# Patient Record
Sex: MALE | Race: WHITE | NOT HISPANIC OR LATINO | Employment: OTHER | URBAN - METROPOLITAN AREA
[De-identification: names, ages, dates, MRNs, and addresses within clinical notes are randomized per-mention and may not be internally consistent; named-entity substitution may affect disease eponyms.]

---

## 2017-01-10 ENCOUNTER — OFFICE VISIT (OUTPATIENT)
Dept: ENDOCRINOLOGY | Age: 62
End: 2017-01-10

## 2017-01-10 VITALS
BODY MASS INDEX: 28.5 KG/M2 | SYSTOLIC BLOOD PRESSURE: 130 MMHG | OXYGEN SATURATION: 97 % | HEIGHT: 72 IN | DIASTOLIC BLOOD PRESSURE: 70 MMHG | WEIGHT: 210.4 LBS | HEART RATE: 62 BPM

## 2017-01-10 DIAGNOSIS — I10 ESSENTIAL HYPERTENSION: Primary | ICD-10-CM

## 2017-01-10 DIAGNOSIS — N18.9 CKD (CHRONIC KIDNEY DISEASE), UNSPECIFIED STAGE: ICD-10-CM

## 2017-01-10 DIAGNOSIS — E78.5 HYPERLIPIDEMIA, UNSPECIFIED HYPERLIPIDEMIA TYPE: ICD-10-CM

## 2017-01-10 DIAGNOSIS — E11.8 TYPE 2 DIABETES MELLITUS WITH COMPLICATION, WITHOUT LONG-TERM CURRENT USE OF INSULIN (HCC): ICD-10-CM

## 2017-01-10 PROCEDURE — 99214 OFFICE O/P EST MOD 30 MIN: CPT | Performed by: INTERNAL MEDICINE

## 2017-01-10 NOTE — PROGRESS NOTES
"Subjective   Jamal Lemus is a 61 y.o. male.     HPI Comments: Hypertension. DM2. B/S 0x a day.Last DM eye exam about a year ago. Last DM foot exam today with Dr. Hayes.    Hypertension     Diabetes        He has type 2 diabetes mellitus and has been on Tradjenta 5 mg once a day once a day. He does not check his blood sugars at home. He has gained 4 lbs since 6/16.     His last eye examination was 12/15 and there is no retinopathy. He denies any blurred vision. He denies any paresthesias.  He has no microalbuminuria on urine sample taken in June 2016     He has hypertension and renal insufficiency. He is on Coreg, chlorthalidone, eplerenone, and fosinopril. He follows with Dr. Johnson. He denies any chest pain, shortness of breath, or pedal edema.     He has hyperlipidemia and has been on Lipitor 10 mg/day. He denies any myalgia. His last meal was 7 AM.    He has just retired.  He is due for colonoscopy and prostate exam.  He does not have a primary care physician yet.    The following portions of the patient's history were reviewed and updated as appropriate: allergies, current medications, past family history, past medical history, past social history, past surgical history and problem list.    Review of Systems   Constitutional: Negative.    HENT: Negative.    Eyes: Negative.    Respiratory: Negative.    Cardiovascular: Negative.    Gastrointestinal: Negative.    Endocrine: Negative.    Genitourinary: Negative.    Musculoskeletal: Negative.    Skin: Negative.    Allergic/Immunologic: Negative.    Neurological: Negative.    Hematological: Negative.    Psychiatric/Behavioral: Negative.        Objective      Vitals:    01/10/17 1012   BP: 150/100   BP Location: Right arm   Patient Position: Sitting   Cuff Size: Large Adult   Pulse: 62   SpO2: 97%   Weight: 210 lb 6.4 oz (95.4 kg)   Height: 72\" (182.9 cm)     Physical Exam   Constitutional: He is oriented to person, place, and time. He appears well-developed and " well-nourished. No distress.   HENT:   Head: Normocephalic.   Nose: Nose normal.   Mouth/Throat: No oropharyngeal exudate.   Eyes: Conjunctivae and EOM are normal. Right eye exhibits no discharge. Left eye exhibits no discharge. No scleral icterus.   Neck: Neck supple. No JVD present. No tracheal deviation present. No thyromegaly present.   Cardiovascular: Normal rate, regular rhythm and intact distal pulses.  Exam reveals no friction rub.    No murmur heard.  Pulmonary/Chest: Effort normal and breath sounds normal. He has no wheezes. He has no rales. He exhibits no tenderness.   Abdominal: Soft. Bowel sounds are normal. He exhibits no distension and no mass. There is no tenderness.   Musculoskeletal: Normal range of motion. He exhibits no edema, tenderness or deformity.   Lymphadenopathy:     He has no cervical adenopathy.   Neurological: He is alert and oriented to person, place, and time. He displays normal reflexes. Coordination normal.   Intact light touch   Skin: Skin is warm and dry. No rash noted. No erythema. No pallor.   Psychiatric: He has a normal mood and affect. His behavior is normal.     Office Visit on 06/08/2016   Component Date Value Ref Range Status   • Glucose 06/08/2016 111* 65 - 99 mg/dL Final   • BUN 06/08/2016 28* 8 - 23 mg/dL Final   • Creatinine 06/08/2016 1.76* 0.76 - 1.27 mg/dL Final   • eGFR Non African Am 06/08/2016 40* >60 mL/min/1.73 Final   • eGFR African Am 06/08/2016 48* >60 mL/min/1.73 Final   • BUN/Creatinine Ratio 06/08/2016 15.9  7.0 - 25.0 Final   • Sodium 06/08/2016 139  136 - 145 mmol/L Final   • Potassium 06/08/2016 4.8  3.5 - 5.2 mmol/L Final   • Chloride 06/08/2016 101  98 - 107 mmol/L Final   • Total CO2 06/08/2016 24.3  22.0 - 29.0 mmol/L Final   • Calcium 06/08/2016 10.5  8.6 - 10.5 mg/dL Final   • Total Protein 06/08/2016 7.6  6.0 - 8.5 g/dL Final   • Albumin 06/08/2016 4.80  3.50 - 5.20 g/dL Final   • Globulin 06/08/2016 2.8  gm/dL Final   • A/G Ratio 06/08/2016 1.7   g/dL Final   • Total Bilirubin 06/08/2016 0.7  0.1 - 1.2 mg/dL Final   • Alkaline Phosphatase 06/08/2016 93  39 - 117 U/L Final   • AST (SGOT) 06/08/2016 29  1 - 40 U/L Final   • ALT (SGPT) 06/08/2016 66* 1 - 41 U/L Final   • Total Cholesterol 06/08/2016 140  0 - 200 mg/dL Final   • Triglycerides 06/08/2016 97  0 - 150 mg/dL Final   • HDL Cholesterol 06/08/2016 59  40 - 60 mg/dL Final   • VLDL Cholesterol 06/08/2016 19.4  5 - 40 mg/dL Final   • LDL Cholesterol  06/08/2016 62  0 - 100 mg/dL Final   • Hemoglobin A1C 06/08/2016 6.40* 4.80 - 5.60 % Final   • TSH 06/08/2016 1.460  0.270 - 4.200 mIU/mL Final   • Creatinine, Urine 06/08/2016 52.7  Not Estab. mg/dL Final   • Microalbumin, Urine 06/08/2016 <3.0  Not Estab. ug/mL Final   • Microalbumin/Creatinine Ratio Urine 06/08/2016 <5.7  0.0 - 30.0 mg/g creat Final     Assessment/Plan   Jamal was seen today for hypertension and diabetes.    Diagnoses and all orders for this visit:    Essential hypertension    Type 2 diabetes mellitus with complication, without long-term current use of insulin    CKD (chronic kidney disease), unspecified stage    Hyperlipidemia, unspecified hyperlipidemia type      Continue Tradjenta 5 mg once a day.  Check hemoglobin A1c.  Continue Coreg, chlorthalidone, eplerenone, and fosinopril per Dr. Johnson.  Follow-up with Dr. Johnson as scheduled  Continue Lipitor 10 mg once a day.  Check lipid profile.  Advised to see her primary care physician for colonoscopy and prostate exam.    Send copy of my notes and labs to Dr. Johnson.    RTC 6 mos.

## 2017-01-10 NOTE — LETTER
January 10, 2017     Bull Johnson MD  [address]  [address]  [place] [id number]    Patient: Jamal Lemus   YOB: 1955   Date of Visit: 1/10/2017       Dear Dr. Alex MD:    Thank you for referring Jamal Lemus to me for evaluation. Below are the relevant portions of my assessment and plan of care.    If you have questions, please do not hesitate to call me. I look forward to following Jamal along with you.         Sincerely,        Miguel Ángel Hayes MD        CC: No Recipients  Miguel Ángel Hayes MD  1/10/2017 11:19 AM  Sign at close encounter  Subjective   Jamal Lemus is a 61 y.o. male.     HPI Comments: Hypertension. DM2. B/S 0x a day.Last DM eye exam about a year ago. Last DM foot exam today with Dr. Hayes.    Hypertension     Diabetes        He has type 2 diabetes mellitus and has been on Tradjenta 5 mg once a day once a day. He does not check his blood sugars at home. He has gained 4 lbs since 6/16.     His last eye examination was 12/15 and there is no retinopathy. He denies any blurred vision. He denies any paresthesias.  He has no microalbuminuria on urine sample taken in June 2016     He has hypertension and renal insufficiency. He is on Coreg, chlorthalidone, eplerenone, and fosinopril. He follows with Dr. Johnson. He denies any chest pain, shortness of breath, or pedal edema.     He has hyperlipidemia and has been on Lipitor 10 mg/day. He denies any myalgia. His last meal was 7 AM.    He has just retired.  He is due for colonoscopy and prostate exam.  He does not have a primary care physician yet.    The following portions of the patient's history were reviewed and updated as appropriate: allergies, current medications, past family history, past medical history, past social history, past surgical history and problem list.    Review of Systems   Constitutional: Negative.    HENT: Negative.    Eyes: Negative.    Respiratory: Negative.    Cardiovascular: Negative.    Gastrointestinal:  "Negative.    Endocrine: Negative.    Genitourinary: Negative.    Musculoskeletal: Negative.    Skin: Negative.    Allergic/Immunologic: Negative.    Neurological: Negative.    Hematological: Negative.    Psychiatric/Behavioral: Negative.        Objective      Vitals:    01/10/17 1012   BP: 150/100   BP Location: Right arm   Patient Position: Sitting   Cuff Size: Large Adult   Pulse: 62   SpO2: 97%   Weight: 210 lb 6.4 oz (95.4 kg)   Height: 72\" (182.9 cm)     Physical Exam   Constitutional: He is oriented to person, place, and time. He appears well-developed and well-nourished. No distress.   HENT:   Head: Normocephalic.   Nose: Nose normal.   Mouth/Throat: No oropharyngeal exudate.   Eyes: Conjunctivae and EOM are normal. Right eye exhibits no discharge. Left eye exhibits no discharge. No scleral icterus.   Neck: Neck supple. No JVD present. No tracheal deviation present. No thyromegaly present.   Cardiovascular: Normal rate, regular rhythm and intact distal pulses.  Exam reveals no friction rub.    No murmur heard.  Pulmonary/Chest: Effort normal and breath sounds normal. He has no wheezes. He has no rales. He exhibits no tenderness.   Abdominal: Soft. Bowel sounds are normal. He exhibits no distension and no mass. There is no tenderness.   Musculoskeletal: Normal range of motion. He exhibits no edema, tenderness or deformity.   Lymphadenopathy:     He has no cervical adenopathy.   Neurological: He is alert and oriented to person, place, and time. He displays normal reflexes. Coordination normal.   Intact light touch   Skin: Skin is warm and dry. No rash noted. No erythema. No pallor.   Psychiatric: He has a normal mood and affect. His behavior is normal.     Office Visit on 06/08/2016   Component Date Value Ref Range Status   • Glucose 06/08/2016 111* 65 - 99 mg/dL Final   • BUN 06/08/2016 28* 8 - 23 mg/dL Final   • Creatinine 06/08/2016 1.76* 0.76 - 1.27 mg/dL Final   • eGFR Non African Am 06/08/2016 40* >60 " mL/min/1.73 Final   • eGFR  Am 06/08/2016 48* >60 mL/min/1.73 Final   • BUN/Creatinine Ratio 06/08/2016 15.9  7.0 - 25.0 Final   • Sodium 06/08/2016 139  136 - 145 mmol/L Final   • Potassium 06/08/2016 4.8  3.5 - 5.2 mmol/L Final   • Chloride 06/08/2016 101  98 - 107 mmol/L Final   • Total CO2 06/08/2016 24.3  22.0 - 29.0 mmol/L Final   • Calcium 06/08/2016 10.5  8.6 - 10.5 mg/dL Final   • Total Protein 06/08/2016 7.6  6.0 - 8.5 g/dL Final   • Albumin 06/08/2016 4.80  3.50 - 5.20 g/dL Final   • Globulin 06/08/2016 2.8  gm/dL Final   • A/G Ratio 06/08/2016 1.7  g/dL Final   • Total Bilirubin 06/08/2016 0.7  0.1 - 1.2 mg/dL Final   • Alkaline Phosphatase 06/08/2016 93  39 - 117 U/L Final   • AST (SGOT) 06/08/2016 29  1 - 40 U/L Final   • ALT (SGPT) 06/08/2016 66* 1 - 41 U/L Final   • Total Cholesterol 06/08/2016 140  0 - 200 mg/dL Final   • Triglycerides 06/08/2016 97  0 - 150 mg/dL Final   • HDL Cholesterol 06/08/2016 59  40 - 60 mg/dL Final   • VLDL Cholesterol 06/08/2016 19.4  5 - 40 mg/dL Final   • LDL Cholesterol  06/08/2016 62  0 - 100 mg/dL Final   • Hemoglobin A1C 06/08/2016 6.40* 4.80 - 5.60 % Final   • TSH 06/08/2016 1.460  0.270 - 4.200 mIU/mL Final   • Creatinine, Urine 06/08/2016 52.7  Not Estab. mg/dL Final   • Microalbumin, Urine 06/08/2016 <3.0  Not Estab. ug/mL Final   • Microalbumin/Creatinine Ratio Urine 06/08/2016 <5.7  0.0 - 30.0 mg/g creat Final     Assessment/Plan   Jamal was seen today for hypertension and diabetes.    Diagnoses and all orders for this visit:    Essential hypertension    Type 2 diabetes mellitus with complication, without long-term current use of insulin    CKD (chronic kidney disease), unspecified stage    Hyperlipidemia, unspecified hyperlipidemia type      Continue Tradjenta 5 mg once a day.  Check hemoglobin A1c.  Continue Coreg, chlorthalidone, eplerenone, and fosinopril per Dr. Johnson.  Follow-up with Dr. Johnson as scheduled  Continue Lipitor 10 mg once a day.  Check  lipid profile.  Advised to see her primary care physician for colonoscopy and prostate exam.    Send copy of my notes and labs to Dr. Johnson.    RTC 6 mos.

## 2017-01-10 NOTE — MR AVS SNAPSHOT
Jamal Lemus   1/10/2017 10:00 AM   Office Visit    Dept Phone:  [phone]   Encounter #:  [id number]    Provider:  Miguel Ángel Hayes MD   Department:  [facility] MEDICAL [facility] ENDOCRINOLOGY                Your Full Care Plan              Your Updated Medication List          This list is accurate as of: 1/10/17 11:36 AM.  Always use your most recent med list.                atorvastatin 10 MG tablet   Commonly known as:  LIPITOR   Take 1 tablet by mouth daily.       carvedilol 25 MG tablet   Commonly known as:  COREG       chlorthalidone 25 MG tablet   Commonly known as:  HYGROTON       eplerenone 25 MG tablet   Commonly known as:  INSPRA       fosinopril 20 MG tablet   Commonly known as:  MONOPRIL       linagliptin 5 MG tablet tablet   Commonly known as:  TRADJENTA   Take 1 tablet by mouth Daily.               We Performed the Following     Comprehensive Metabolic Panel     Hemoglobin A1c     Lipid Panel       You Were Diagnosed With        Codes Comments    Essential hypertension    -  Primary ICD-10-CM: I10  ICD-9-CM: 401.9     Type 2 diabetes mellitus with complication, without long-term current use of insulin     ICD-10-CM: E11.8  ICD-9-CM: 250.90     CKD (chronic kidney disease), unspecified stage     ICD-10-CM: N18.9  ICD-9-CM: 585.9     Hyperlipidemia, unspecified hyperlipidemia type     ICD-10-CM: E78.5  ICD-9-CM: 272.4       Instructions     None    Patient Instructions History      Upcoming Appointments     Visit Type Date Time Department    OFFICE VISIT 1/10/2017 10:00 AM [facility]    OFFICE VISIT 7/10/2017 10:00 AM [facility] STEV[facility]CIHA[facility]      Medic Trace Signup     Our records indicate that you have an active [employer] account.    You can view your After Visit Summary by going to [employer] and logging in with your Medic Trace username and password.  If you don't have a Medic Trace username and password but a parent or guardian has access to your  "record, the parent or guardian should login with their own [employer] username and password and access your record to view the After Visit Summary.    If you have questions, you can email [email or web address] or call [phone] to talk to our [employer] staff.  Remember, [employer] is NOT to be used for urgent needs.  For medical emergencies, dial 911.               Other Info from Your Visit           Your Appointments     Jul 10, 2017 10:00 AM EDT   Office Visit with Miguel Ángel Hayes MD   [facility] MEDICAL [facility] ENDOCRINOLOGY (--)    [address] [place] [id number] [phone]           Arrive 15 minutes prior to appointment.              Allergies     No Known Allergies      Reason for Visit     Hypertension     Diabetes           Vital Signs     Blood Pressure Pulse Height Weight Oxygen Saturation Body Mass Index    130/70 62 72\" (182.9 cm) 210 lb 6.4 oz (95.4 kg) 97% 28.54 kg/m2    Smoking Status                   Never Smoker           Problems and Diagnoses Noted     Chronic kidney disease    High blood pressure    High cholesterol or triglycerides    Type 2 diabetes mellitus with manifestations        "

## 2017-01-11 LAB
ALBUMIN SERPL-MCNC: 4.7 G/DL (ref 3.5–5.2)
ALBUMIN/GLOB SERPL: 1.7 G/DL
ALP SERPL-CCNC: 89 U/L (ref 39–117)
ALT SERPL-CCNC: 40 U/L (ref 1–41)
AST SERPL-CCNC: 21 U/L (ref 1–40)
BILIRUB SERPL-MCNC: 0.8 MG/DL (ref 0.1–1.2)
BUN SERPL-MCNC: 26 MG/DL (ref 8–23)
BUN/CREAT SERPL: 16 (ref 7–25)
CALCIUM SERPL-MCNC: 10.3 MG/DL (ref 8.6–10.5)
CHLORIDE SERPL-SCNC: 97 MMOL/L (ref 98–107)
CHOLEST SERPL-MCNC: 147 MG/DL (ref 0–200)
CO2 SERPL-SCNC: 24.4 MMOL/L (ref 22–29)
CREAT SERPL-MCNC: 1.63 MG/DL (ref 0.76–1.27)
GLOBULIN SER CALC-MCNC: 2.8 GM/DL
GLUCOSE SERPL-MCNC: 99 MG/DL (ref 65–99)
HBA1C MFR BLD: 6.35 % (ref 4.8–5.6)
HDLC SERPL-MCNC: 60 MG/DL (ref 40–60)
LDLC SERPL CALC-MCNC: 67 MG/DL (ref 0–100)
POTASSIUM SERPL-SCNC: 4.8 MMOL/L (ref 3.5–5.2)
PROT SERPL-MCNC: 7.5 G/DL (ref 6–8.5)
SODIUM SERPL-SCNC: 136 MMOL/L (ref 136–145)
TRIGL SERPL-MCNC: 98 MG/DL (ref 0–150)
VLDLC SERPL CALC-MCNC: 19.6 MG/DL (ref 5–40)

## 2017-02-09 RX ORDER — LINAGLIPTIN 5 MG/1
TABLET, FILM COATED ORAL
Qty: 90 TABLET | Refills: 1 | Status: SHIPPED | OUTPATIENT
Start: 2017-02-09 | End: 2017-08-08 | Stop reason: SDUPTHER

## 2017-03-24 ENCOUNTER — TRANSCRIBE ORDERS (OUTPATIENT)
Dept: ADMINISTRATIVE | Facility: HOSPITAL | Age: 62
End: 2017-03-24

## 2017-03-24 DIAGNOSIS — L98.9 SKIN LESION: Primary | ICD-10-CM

## 2017-04-24 ENCOUNTER — HOSPITAL ENCOUNTER (OUTPATIENT)
Dept: INFUSION THERAPY | Facility: HOSPITAL | Age: 62
Discharge: HOME | End: 2017-04-24
Attending: SURGERY | Admitting: SURGERY

## 2017-04-24 VITALS
SYSTOLIC BLOOD PRESSURE: 169 MMHG | DIASTOLIC BLOOD PRESSURE: 96 MMHG | TEMPERATURE: 96 F | HEART RATE: 76 BPM | RESPIRATION RATE: 20 BRPM | OXYGEN SATURATION: 96 %

## 2017-04-24 DIAGNOSIS — L98.9 SKIN LESIONS: Primary | ICD-10-CM

## 2017-04-24 PROCEDURE — 88331 PATH CONSLTJ SURG 1 BLK 1SPC: CPT | Performed by: SURGERY

## 2017-04-24 PROCEDURE — 88332 PATH CONSLTJ SURG EA ADD BLK: CPT | Performed by: SURGERY

## 2017-04-24 PROCEDURE — 88305 TISSUE EXAM BY PATHOLOGIST: CPT | Performed by: SURGERY

## 2017-04-24 RX ORDER — LIDOCAINE HYDROCHLORIDE AND EPINEPHRINE 10; 10 MG/ML; UG/ML
20 INJECTION, SOLUTION INFILTRATION; PERINEURAL ONCE
Status: DISCONTINUED | OUTPATIENT
Start: 2017-04-24 | End: 2017-04-26 | Stop reason: HOSPADM

## 2017-04-24 RX ORDER — LIDOCAINE HYDROCHLORIDE AND EPINEPHRINE 10; 10 MG/ML; UG/ML
20 INJECTION, SOLUTION INFILTRATION; PERINEURAL ONCE
Status: COMPLETED | OUTPATIENT
Start: 2017-04-24 | End: 2017-04-24

## 2017-04-24 RX ADMIN — SODIUM BICARBONATE 0.5 ML: 84 INJECTION, SOLUTION INTRAVENOUS at 08:07

## 2017-04-24 RX ADMIN — LIDOCAINE HYDROCHLORIDE,EPINEPHRINE BITARTRATE 10 ML: 10; .01 INJECTION, SOLUTION INFILTRATION; PERINEURAL at 08:07

## 2017-04-24 NOTE — PATIENT INSTRUCTIONS
MINOR SURGERY DISCHARGE INSTRUCTIONS    IMPORTANT:  The following information will help you return to your best level of health.  Wound Care:  ·  Your dressing on forehead may be removed:  ·   Tomorrow    · Dressing on shoulder may be removed in 4 to 5 days  ·  Clean suture line with peroxide 1-2 times a day.  ·  If incision is on head or neck, cover your pillow with a towel.  ·  Avoid stooping over or heavy lifting.  ·  You may use band aids after dressing is removed; if desired.  ·  Avoid sun exposure to site.  You may shower:  ·  Tomorrow  ·  Apply ointment to incision 1-2 times a day.  Remove old ointment first.  ·  Apply ice to area for 24 hours - 15 minutes on & 15 minutes off.  30 minutes on  & 30 minutes off while awake.  Use an extra pillow when sleeping.  ·  Elevate area for 24-48 hours to reduce swelling.  Medications:   Following this procedure, you should continue to take all other medications as  directed by your primary physician unless otherwise instructed. There have been  no changes to the medications you provided us (with the following exceptions, if  applicable):     You may use Tylenol  for discomfort.   Other: _______________________________________________________  Activity:  ·   ·  You may resume normal activity:  ·    In ___2___ days  ·  No strenuous activity, lifting or sports:  ·   Until seen by your MD    Call your doctor to make an appointment for:     On (date) ____May 2nd____________________________  Physician: Dr. Bell  Office # [phone]  Incision Care  An incision is when a surgeon cuts into your body. After surgery, the incision needs to be cared for properly to prevent infection.   HOW TO CARE FOR YOUR INCISION  · Take medicines only as directed by your health care provider.  · There are many different ways to close and cover an incision, including stitches, skin glue, and adhesive strips. Follow your health care provider's instructions on:    Incision care.    Bandage  (dressing) changes and removal.    Incision closure removal.  · Do not take baths, swim, or use a hot tub until your health care provider approves. You may shower as directed by your health care provider.  · Resume your normal diet and activities as directed.  · Use anti-itch medicine (such as an antihistamine) as directed by your health care provider. The incision may itch while it is healing. Do not pick or scratch at the incision.  · Drink enough fluid to keep your urine clear or pale yellow.  SEEK MEDICAL CARE IF:   · You have drainage, redness, swelling, or pain at your incision site.  · You have muscle aches, chills, or a general ill feeling.  · You notice a bad smell coming from the incision or dressing.  · Your incision edges separate after the sutures, staples, or skin adhesive strips have been removed.  · You have persistent nausea or vomiting.  · You have a fever.  · You are dizzy.  SEEK IMMEDIATE MEDICAL CARE IF:   · You have a rash.  · You faint.  · You have difficulty breathing.  MAKE SURE YOU:   · Understand these instructions.  · Will watch your condition.  · Will get help right away if you are not doing well or get worse.     This information is not intended to replace advice given to you by your health care provider. Make sure you discuss any questions you have with your health care provider.     Document Released: 07/07/2006 Document Revised: 01/08/2016 Document Reviewed: 02/11/2015  [employer] Interactive Patient Education ©2016 [employer]

## 2017-04-24 NOTE — OP NOTE
DATE OF PROCEDURE:  04/24/2017    SURGEON: Tim Bell MD     ASSISTANT: None.     PREOPERATIVE DIAGNOSES:  1.  Basal cell carcinoma of right forehead.    2.  Basal cell carcinoma of left posterior shoulder (back).  3.  Personal history of multiple skin cancers.     POSTOPERATIVE DIAGNOSES:   1.  Basal cell carcinoma of right forehead.    2.  Basal cell carcinoma of left posterior shoulder (back).  3.  Personal history of multiple skin cancers.     PROCEDURES PERFORMED:   1.  Excision of basal cell carcinoma of the forehead with frozen section control of margins and layered closure.   2.  Excision of basal cell carcinoma of left posterior shoulder with frozen section and layered closure.     ANESTHESIA: 1% lidocaine with epinephrine.     FINDINGS: The patient was brought to the procedure room in the ambulatory care unit. He was marked out for a lenticular excision around the 2 previously identified clinical basal cell carcinomas. Then 1% lidocaine with epinephrine was infiltrated locally.     He was placed in a prone position for access to remove the lesion of the posterior shoulder. He was prepped with Betadine and draped into a sterile field. The lesion was excised and marked with a short suture at the superior edge and a long suture on the left lateral tip. It was submitted for frozen section. Wide undermining was carried out and hemostasis achieved with electrocautery. Wound closure was accomplished with 4-0 Vicryl subcuticular sutures and 5-0 nylon skin sutures. The wound was dressed with bacitracin ointment, 2 x 2, and Tegaderm.     The patient was then returned to a supine position. His head was elevated. The previously marked out the site was prepped with Betadine and draped into a sterile field. The lesion of the forehead was excised and marked with a short suture superiorly and a long suture on the right lateral tip. The wound edges were undermined and hemostasis was achieved with electrocautery. Wound  closure was accomplished with 4-0 Vicryl subcuticular sutures 5-0 nylon skin sutures. Bacitracin ointment was applied and 2 x 2 gauze was applied and secured with paper tape.     The patient tolerated the procedures well. Written and verbal instructions in care were given.  He had no further questions. He will return in 8 days for suture removal.          SHANTI Sesay:tamra  D:   04/24/2017 09:37:44  T:   04/24/2017 10:26:40  Job ID:   [id number]  Document ID:   [id number]  cc:

## 2017-04-24 NOTE — PROGRESS NOTES
Tolerated procedure very well. Discussed discharge instructions and wound care and patient verbalizes understanding. Provided the patient a copy of the AVS. Discharged home ambulatory with wife at 0940.

## 2017-04-25 LAB
CYTO UR: NORMAL
LAB AP CASE REPORT: NORMAL
LAB AP CLINICAL INFORMATION: NORMAL
Lab: NORMAL
Lab: NORMAL
PATH REPORT.FINAL DX SPEC: NORMAL
PATH REPORT.GROSS SPEC: NORMAL

## 2017-05-11 RX ORDER — ATORVASTATIN CALCIUM 10 MG/1
TABLET, FILM COATED ORAL
Qty: 90 TABLET | Refills: 1 | Status: SHIPPED | OUTPATIENT
Start: 2017-05-11 | End: 2017-11-01 | Stop reason: SDUPTHER

## 2017-07-10 ENCOUNTER — OFFICE VISIT (OUTPATIENT)
Dept: ENDOCRINOLOGY | Age: 62
End: 2017-07-10

## 2017-07-10 VITALS
OXYGEN SATURATION: 97 % | SYSTOLIC BLOOD PRESSURE: 146 MMHG | DIASTOLIC BLOOD PRESSURE: 100 MMHG | WEIGHT: 208.4 LBS | HEART RATE: 65 BPM | BODY MASS INDEX: 28.23 KG/M2 | HEIGHT: 72 IN

## 2017-07-10 DIAGNOSIS — I10 ESSENTIAL HYPERTENSION: ICD-10-CM

## 2017-07-10 DIAGNOSIS — E11.8 TYPE 2 DIABETES MELLITUS WITH COMPLICATION, WITHOUT LONG-TERM CURRENT USE OF INSULIN (HCC): Primary | ICD-10-CM

## 2017-07-10 DIAGNOSIS — E78.5 HYPERLIPIDEMIA, UNSPECIFIED HYPERLIPIDEMIA TYPE: ICD-10-CM

## 2017-07-10 DIAGNOSIS — N18.9 CKD (CHRONIC KIDNEY DISEASE), UNSPECIFIED STAGE: ICD-10-CM

## 2017-07-10 PROCEDURE — 99214 OFFICE O/P EST MOD 30 MIN: CPT | Performed by: INTERNAL MEDICINE

## 2017-07-10 NOTE — PROGRESS NOTES
Subjective   Jamal Lemus is a 62 y.o. male.     HPI Comments: F/u fo dm  2, hyperlipidemia ,hypertension / does not test bs / last dm eye exam 12/1/15/ last dm foot exam 1/10/17 with dr Hayes     Diabetes   He has type 2 diabetes mellitus. No MedicAlert identification noted. The initial diagnosis of diabetes was made 4 years ago. Pertinent negatives for hypoglycemia include no confusion, dizziness, headaches, hunger, mood changes, nervousness/anxiousness, pallor, seizures, sleepiness, speech difficulty, sweats or tremors. Pertinent negatives for diabetes include no blurred vision, no chest pain, no fatigue, no foot paresthesias, no foot ulcerations, no polydipsia, no polyphagia, no polyuria, no visual change, no weakness and no weight loss. Pertinent negatives for hypoglycemia complications include no blackouts, no hospitalization, no nocturnal hypoglycemia, no required assistance and no required glucagon injection. Symptoms are stable. Pertinent negatives for diabetic complications include no CVA, heart disease, impotence, nephropathy, peripheral neuropathy, PVD or retinopathy. Risk factors for coronary artery disease include hypertension. Current diabetic treatment includes oral agent (monotherapy). He is compliant with treatment all of the time. His weight is stable. When asked about meal planning, he reported none. He has not had a previous visit with a dietitian. He participates in exercise every other day. There is no compliance with monitoring of blood glucose. There is no change in his home blood glucose trend. He does not see a podiatrist.Eye exam is not current.   Hypertension   Pertinent negatives include no blurred vision, chest pain, headaches or sweats. There is no history of CVA, PVD or retinopathy.   Hyperlipidemia   Pertinent negatives include no chest pain.      He has type 2 diabetes mellitus and has been on Tradjenta 5 mg once a day once a day. He does not check his blood sugars at home. He has  "lost 2 lbs since 1/17.      His last eye examination was 12/15 and there is no retinopathy. He denies any blurred vision. He denies any paresthesias. He has no microalbuminuria on urine sample taken in June 2016      He has hypertension and renal insufficiency. He is on Coreg, chlorthalidone, eplerenone, and fosinopril. He follows with Dr. Johnson. He denies any chest pain, shortness of breath, or pedal edema.      He has hyperlipidemia and has been on Lipitor 10 mg/day. He denies any myalgia. His last meal was 7 AM.     He is retired. He is due for colonoscopy and prostate exam. He does not have a primary care physician yet.  He denies urinary or bowel symptoms.    He had basal skin cancer removed from left shoulder and forehead in 4/17 by Dr. Bell.  The following portions of the patient's history were reviewed and updated as appropriate: allergies, current medications, past family history, past medical history, past social history, past surgical history and problem list.    Review of Systems   Constitutional: Negative.  Negative for fatigue and weight loss.   Eyes: Negative.  Negative for blurred vision.   Respiratory: Negative.    Cardiovascular: Negative.  Negative for chest pain.   Gastrointestinal: Negative.    Endocrine: Negative.  Negative for polydipsia, polyphagia and polyuria.   Genitourinary: Negative.  Negative for impotence.   Musculoskeletal: Negative.    Skin: Negative.  Negative for pallor.   Allergic/Immunologic: Negative.    Neurological: Negative.  Negative for dizziness, tremors, seizures, speech difficulty, weakness and headaches.   Hematological: Negative.    Psychiatric/Behavioral: Negative.  Negative for confusion. The patient is not nervous/anxious.        Objective      Vitals:    07/10/17 0951   BP: 146/100   BP Location: Right arm   Patient Position: Sitting   Cuff Size: Large Adult   Pulse: 65   SpO2: 97%   Weight: 208 lb 6.4 oz (94.5 kg)   Height: 72\" (182.9 cm)     Physical Exam "   Constitutional: He is oriented to person, place, and time. He appears well-developed and well-nourished. No distress.   HENT:   Head: Normocephalic.   Right Ear: External ear normal.   Nose: Nose normal.   Mouth/Throat: No oropharyngeal exudate.   Eyes: Conjunctivae and EOM are normal. Right eye exhibits no discharge. Left eye exhibits no discharge. No scleral icterus.   Neck: Neck supple. No JVD present. No tracheal deviation present. No thyromegaly present.   Cardiovascular: Normal rate, regular rhythm, normal heart sounds and intact distal pulses.  Exam reveals no friction rub.    No murmur heard.  Pulmonary/Chest: Effort normal and breath sounds normal. No stridor. No respiratory distress. He has no wheezes. He has no rales. He exhibits no tenderness.   Abdominal: Soft. Bowel sounds are normal. He exhibits no distension and no mass. There is no tenderness.   Musculoskeletal: Normal range of motion. He exhibits no edema, tenderness or deformity.   Lymphadenopathy:     He has no cervical adenopathy.   Neurological: He is alert and oriented to person, place, and time. He has normal reflexes. He displays normal reflexes. No cranial nerve deficit. Coordination normal.   Intact light touch   Skin: Skin is warm and dry. No rash noted. He is not diaphoretic. No erythema. No pallor.   Psychiatric: He has a normal mood and affect. His behavior is normal.     Hospital Outpatient Visit on 04/24/2017   Component Date Value Ref Range Status   • Case Report 04/24/2017    Final                    Value:Surgical Pathology Report                         Case: [id number]                                  Authorizing Provider:  Tim Bell MD          Collected:           04/24/2017 08:27 AM          Ordering Location:     [facility]  Received:            04/24/2017 08:32 AM                                 [facility]                                                                     Pathologist:           Jay  AGUS Zhu MD                                                          Specimens:   1) - Shoulder, Left, excision lesion left shoulder                                                  2) - Forehead, excision lesion right forehead                                             • Clinical Information 04/24/2017    Final                    Value:This result contains rich text formatting which cannot be displayed here.   • Final Diagnosis 04/24/2017    Final                    Value:This result contains rich text formatting which cannot be displayed here.   • Intraoperative Consultation 04/24/2017    Final                    Value:This result contains rich text formatting which cannot be displayed here.   • Gross Description 04/24/2017    Final                    Value:This result contains rich text formatting which cannot be displayed here.   • Microscopic Description 04/24/2017    Final                    Value:This result contains rich text formatting which cannot be displayed here.     Assessment/Plan   Jamal was seen today for diabetes, hypertension and hyperlipidemia.    Diagnoses and all orders for this visit:    Type 2 diabetes mellitus with complication, without long-term current use of insulin  -     Comprehensive Metabolic Panel  -     Hemoglobin A1c  -     TSH  -     T4, Free  -     Microalbumin / Creatinine Urine Ratio    Hyperlipidemia, unspecified hyperlipidemia type  -     Lipid Panel    Essential hypertension    CKD (chronic kidney disease), unspecified stage      Continue Tradjenta 5 mg once a day.  Continue Lipitor.  Continue Coreg, chlorthalidone, eplerenone, and fosinopril.  Will defer blood pressure control to Dr. Johnson.  Advised to get a primary care physician for colonoscopy and prostate exam.    RTC 6 mos.    Send copy of note and labs to Dr. Johnson.

## 2017-07-11 LAB
ALBUMIN SERPL-MCNC: 4.7 G/DL (ref 3.5–5.2)
ALBUMIN/CREAT UR: <2.5 MG/G CREAT (ref 0–30)
ALBUMIN/GLOB SERPL: 1.9 G/DL
ALP SERPL-CCNC: 99 U/L (ref 39–117)
ALT SERPL-CCNC: 35 U/L (ref 1–41)
AST SERPL-CCNC: 27 U/L (ref 1–40)
BILIRUB SERPL-MCNC: 0.5 MG/DL (ref 0.1–1.2)
BUN SERPL-MCNC: 27 MG/DL (ref 8–23)
BUN/CREAT SERPL: 14.6 (ref 7–25)
CALCIUM SERPL-MCNC: 9.8 MG/DL (ref 8.6–10.5)
CHLORIDE SERPL-SCNC: 103 MMOL/L (ref 98–107)
CHOLEST SERPL-MCNC: 134 MG/DL (ref 0–200)
CO2 SERPL-SCNC: 21.4 MMOL/L (ref 22–29)
CREAT SERPL-MCNC: 1.85 MG/DL (ref 0.76–1.27)
CREAT UR-MCNC: 121.7 MG/DL
GLOBULIN SER CALC-MCNC: 2.5 GM/DL
GLUCOSE SERPL-MCNC: 117 MG/DL (ref 65–99)
HBA1C MFR BLD: 6.3 % (ref 4.8–5.6)
HDLC SERPL-MCNC: 54 MG/DL (ref 40–60)
LDLC SERPL CALC-MCNC: 68 MG/DL (ref 0–100)
MICROALBUMIN UR-MCNC: <3 UG/ML
POTASSIUM SERPL-SCNC: 4.6 MMOL/L (ref 3.5–5.2)
PROT SERPL-MCNC: 7.2 G/DL (ref 6–8.5)
SODIUM SERPL-SCNC: 140 MMOL/L (ref 136–145)
T4 FREE SERPL-MCNC: 1.18 NG/DL (ref 0.93–1.7)
TRIGL SERPL-MCNC: 58 MG/DL (ref 0–150)
TSH SERPL DL<=0.005 MIU/L-ACNC: 1.22 MIU/ML (ref 0.27–4.2)
VLDLC SERPL CALC-MCNC: 11.6 MG/DL (ref 5–40)

## 2017-08-08 RX ORDER — LINAGLIPTIN 5 MG/1
TABLET, FILM COATED ORAL
Qty: 90 TABLET | Refills: 1 | Status: SHIPPED | OUTPATIENT
Start: 2017-08-08 | End: 2018-02-07 | Stop reason: SDUPTHER

## 2017-11-01 RX ORDER — ATORVASTATIN CALCIUM 10 MG/1
10 TABLET, FILM COATED ORAL NIGHTLY
Qty: 90 TABLET | Refills: 1 | Status: SHIPPED | OUTPATIENT
Start: 2017-11-01 | End: 2018-04-27 | Stop reason: SDUPTHER

## 2018-01-15 ENCOUNTER — OFFICE VISIT (OUTPATIENT)
Dept: ENDOCRINOLOGY | Age: 63
End: 2018-01-15

## 2018-01-15 VITALS
BODY MASS INDEX: 27.98 KG/M2 | HEART RATE: 68 BPM | HEIGHT: 72 IN | WEIGHT: 206.6 LBS | DIASTOLIC BLOOD PRESSURE: 100 MMHG | OXYGEN SATURATION: 96 % | SYSTOLIC BLOOD PRESSURE: 150 MMHG

## 2018-01-15 DIAGNOSIS — E11.8 TYPE 2 DIABETES MELLITUS WITH COMPLICATION, WITHOUT LONG-TERM CURRENT USE OF INSULIN (HCC): Primary | ICD-10-CM

## 2018-01-15 DIAGNOSIS — I10 ESSENTIAL HYPERTENSION: ICD-10-CM

## 2018-01-15 DIAGNOSIS — N18.30 STAGE 3 CHRONIC KIDNEY DISEASE (HCC): ICD-10-CM

## 2018-01-15 DIAGNOSIS — E78.5 HYPERLIPIDEMIA, UNSPECIFIED HYPERLIPIDEMIA TYPE: ICD-10-CM

## 2018-01-15 PROCEDURE — 99214 OFFICE O/P EST MOD 30 MIN: CPT | Performed by: INTERNAL MEDICINE

## 2018-01-15 NOTE — PROGRESS NOTES
Subjective   Jamal Lemus is a 62 y.o. male.     HPI Comments: F/u for dm 1,hypertension, hyperlipidemia / not testing bs / last dm eye exam 12/15/ last dm foot exam today with dr Hayes / flu vaccine declined     Diabetes   He has type 2 diabetes mellitus. No MedicAlert identification noted. The initial diagnosis of diabetes was made 3 years ago. Pertinent negatives for hypoglycemia include no confusion, dizziness, headaches, hunger, mood changes, nervousness/anxiousness, pallor, seizures, sleepiness, speech difficulty, sweats or tremors. Pertinent negatives for diabetes include no blurred vision, no chest pain, no fatigue, no foot paresthesias, no foot ulcerations, no polydipsia, no polyphagia, no polyuria, no visual change, no weakness and no weight loss. Pertinent negatives for hypoglycemia complications include no blackouts, no hospitalization, no nocturnal hypoglycemia, no required assistance and no required glucagon injection. Symptoms are stable. Pertinent negatives for diabetic complications include no CVA, heart disease, impotence, nephropathy, peripheral neuropathy, PVD or retinopathy. Risk factors for coronary artery disease include hypertension. Current diabetic treatment includes oral agent (monotherapy). He is compliant with treatment all of the time. His weight is stable. He is following a generally healthy diet. When asked about meal planning, he reported none. He has not had a previous visit with a dietitian. He participates in exercise weekly. There is no compliance with monitoring of blood glucose. He does not see a podiatrist.Eye exam is not current.   Hyperlipidemia   Pertinent negatives include no chest pain.   Hypertension   Pertinent negatives include no blurred vision, chest pain, headaches or sweats. There is no history of CVA, PVD or retinopathy.      He has type 2 diabetes mellitus and has been on Tradjenta 5 mg once a day once a day. He does not check his blood sugars at home. He has  lost 2 lbs since 7/17.      His last eye examination was 12/15 and there is no retinopathy. He denies any blurred vision. He denies any paresthesias. He has no microalbuminuria on urine sample taken in 7/17.      He has hypertension and renal insufficiency. He is on Coreg, chlorthalidone, eplerenone, and fosinopril. BP at home < 140 systolic.  He follows with Dr. Johnson. He denies any chest pain, shortness of breath, or pedal edema.      He has hyperlipidemia and has been on Lipitor 10 mg/day. He denies any myalgia. His last meal was 7 AM.      He is retired. He is due for colonoscopy and prostate exam. He does not have a primary care physician yet.  He denies urinary or bowel symptoms.    Patient does not want to get flu vaccine, Pneumovax, or shingles vaccine.       The following portions of the patient's history were reviewed and updated as appropriate: allergies, current medications, past family history, past medical history, past social history, past surgical history and problem list.    Review of Systems   Constitutional: Negative.  Negative for fatigue and weight loss.   HENT: Negative.    Eyes: Negative.  Negative for blurred vision.   Respiratory: Negative.    Cardiovascular: Negative.  Negative for chest pain.   Gastrointestinal: Negative.    Endocrine: Negative.  Negative for polydipsia, polyphagia and polyuria.   Genitourinary: Negative.  Negative for impotence.   Musculoskeletal: Negative.    Skin: Negative.  Negative for pallor.   Allergic/Immunologic: Negative.    Neurological: Negative.  Negative for dizziness, tremors, seizures, speech difficulty, weakness and headaches.   Hematological: Negative.    Psychiatric/Behavioral: Negative.  Negative for confusion. The patient is not nervous/anxious.        Objective      Vitals:    01/15/18 0955   BP: 150/100   BP Location: Right arm   Patient Position: Sitting   Cuff Size: Large Adult   Pulse: 68   SpO2: 96%   Weight: 93.7 kg (206 lb 9.6 oz)   Height: 182.9  "cm (72.01\")     Physical Exam   Constitutional: He is oriented to person, place, and time. He appears well-developed and well-nourished. No distress.   HENT:   Head: Normocephalic.   Nose: Nose normal.   Mouth/Throat: No oropharyngeal exudate.   Eyes: Conjunctivae and EOM are normal. Right eye exhibits no discharge. Left eye exhibits no discharge. No scleral icterus.   Neck: Normal range of motion. Neck supple. No JVD present. No tracheal deviation present. No thyromegaly present.   Cardiovascular: Normal rate, regular rhythm and normal heart sounds.  Exam reveals no gallop and no friction rub.    No murmur heard.  Pulmonary/Chest: Effort normal and breath sounds normal. No respiratory distress. He has no wheezes. He has no rales. He exhibits no tenderness.   Abdominal: Soft. Bowel sounds are normal. He exhibits no distension and no mass. There is no tenderness.   Musculoskeletal: Normal range of motion. He exhibits no edema, tenderness or deformity.   Lymphadenopathy:     He has no cervical adenopathy.   Neurological: He is alert and oriented to person, place, and time. He has normal reflexes. He displays normal reflexes.   Skin: Skin is warm and dry. No rash noted. No erythema.   Psychiatric: He has a normal mood and affect. His behavior is normal.     Office Visit on 07/10/2017   Component Date Value Ref Range Status   • Glucose 07/10/2017 117* 65 - 99 mg/dL Final   • BUN 07/10/2017 27* 8 - 23 mg/dL Final   • Creatinine 07/10/2017 1.85* 0.76 - 1.27 mg/dL Final   • eGFR Non African Am 07/10/2017 37* >60 mL/min/1.73 Final   • eGFR African Am 07/10/2017 45* >60 mL/min/1.73 Final   • BUN/Creatinine Ratio 07/10/2017 14.6  7.0 - 25.0 Final   • Sodium 07/10/2017 140  136 - 145 mmol/L Final   • Potassium 07/10/2017 4.6  3.5 - 5.2 mmol/L Final   • Chloride 07/10/2017 103  98 - 107 mmol/L Final   • Total CO2 07/10/2017 21.4* 22.0 - 29.0 mmol/L Final   • Calcium 07/10/2017 9.8  8.6 - 10.5 mg/dL Final   • Total Protein " 07/10/2017 7.2  6.0 - 8.5 g/dL Final   • Albumin 07/10/2017 4.70  3.50 - 5.20 g/dL Final   • Globulin 07/10/2017 2.5  gm/dL Final   • A/G Ratio 07/10/2017 1.9  g/dL Final   • Total Bilirubin 07/10/2017 0.5  0.1 - 1.2 mg/dL Final   • Alkaline Phosphatase 07/10/2017 99  39 - 117 U/L Final   • AST (SGOT) 07/10/2017 27  1 - 40 U/L Final   • ALT (SGPT) 07/10/2017 35  1 - 41 U/L Final   • Total Cholesterol 07/10/2017 134  0 - 200 mg/dL Final   • Triglycerides 07/10/2017 58  0 - 150 mg/dL Final   • HDL Cholesterol 07/10/2017 54  40 - 60 mg/dL Final   • VLDL Cholesterol 07/10/2017 11.6  5 - 40 mg/dL Final   • LDL Cholesterol  07/10/2017 68  0 - 100 mg/dL Final   • Hemoglobin A1C 07/10/2017 6.30* 4.80 - 5.60 % Final    Comment: Hemoglobin A1C Ranges:  Increased Risk for Diabetes  5.7% to 6.4%  Diabetes                     >= 6.5%  Diabetic Goal                < 7.0%     • TSH 07/10/2017 1.220  0.270 - 4.200 mIU/mL Final   • Free T4 07/10/2017 1.18  0.93 - 1.70 ng/dL Final   • Creatinine, Urine 07/10/2017 121.7  Not Estab. mg/dL Final   • Microalbumin, Urine 07/10/2017 <3.0  Not Estab. ug/mL Final   • Microalbumin/Creatinine Ratio Urine 07/10/2017 <2.5  0.0 - 30.0 mg/g creat Final     Assessment/Plan   Jamal was seen today for diabetes, hyperlipidemia and hypertension.    Diagnoses and all orders for this visit:    Type 2 diabetes mellitus with complication, without long-term current use of insulin  -     Comprehensive Metabolic Panel  -     Lipid Panel  -     Hemoglobin A1c  -     TSH  -     T4, Free    Hyperlipidemia, unspecified hyperlipidemia type  -     Comprehensive Metabolic Panel  -     Lipid Panel  -     TSH  -     T4, Free    Essential hypertension  -     Comprehensive Metabolic Panel    Stage 3 chronic kidney disease  -     Comprehensive Metabolic Panel      Continue Tradjenta 5 mg once a day.  Advised to get up eye exam.  Continue Lipitor 10 mg once a day.  Continue Coreg, chlorthalidone, eplerenone, and  fosinopril per Dr. Johnson.  BP checks at home.  Advised to get a PCP.  Advised to get a colonoscopy and prostate exam.  Discussed about the benefit of vaccination    Send copy of my notes and labs to Dr. Johnson.    RTC 6 mos.

## 2018-01-16 LAB
ALBUMIN SERPL-MCNC: 4.8 G/DL (ref 3.5–5.2)
ALBUMIN/GLOB SERPL: 1.7 G/DL
ALP SERPL-CCNC: 93 U/L (ref 39–117)
ALT SERPL-CCNC: 30 U/L (ref 1–41)
AST SERPL-CCNC: 22 U/L (ref 1–40)
BILIRUB SERPL-MCNC: 0.7 MG/DL (ref 0.1–1.2)
BUN SERPL-MCNC: 28 MG/DL (ref 8–23)
BUN/CREAT SERPL: 17.4 (ref 7–25)
CALCIUM SERPL-MCNC: 10.1 MG/DL (ref 8.6–10.5)
CHLORIDE SERPL-SCNC: 97 MMOL/L (ref 98–107)
CHOLEST SERPL-MCNC: 146 MG/DL (ref 0–200)
CO2 SERPL-SCNC: 23.3 MMOL/L (ref 22–29)
CREAT SERPL-MCNC: 1.61 MG/DL (ref 0.76–1.27)
GLOBULIN SER CALC-MCNC: 2.9 GM/DL
GLUCOSE SERPL-MCNC: 104 MG/DL (ref 65–99)
HBA1C MFR BLD: 6.71 % (ref 4.8–5.6)
HDLC SERPL-MCNC: 59 MG/DL (ref 40–60)
INTERPRETATION: NORMAL
INTERPRETATION: NORMAL
LDLC SERPL CALC-MCNC: 68 MG/DL (ref 0–100)
Lab: NORMAL
Lab: NORMAL
POTASSIUM SERPL-SCNC: 4.5 MMOL/L (ref 3.5–5.2)
PROT SERPL-MCNC: 7.7 G/DL (ref 6–8.5)
SODIUM SERPL-SCNC: 135 MMOL/L (ref 136–145)
T4 FREE SERPL-MCNC: 1.32 NG/DL (ref 0.93–1.7)
TRIGL SERPL-MCNC: 96 MG/DL (ref 0–150)
TSH SERPL DL<=0.005 MIU/L-ACNC: 1.04 MIU/ML (ref 0.27–4.2)
VLDLC SERPL CALC-MCNC: 19.2 MG/DL (ref 5–40)

## 2018-02-07 RX ORDER — LINAGLIPTIN 5 MG/1
TABLET, FILM COATED ORAL
Qty: 90 TABLET | Refills: 1 | Status: SHIPPED | OUTPATIENT
Start: 2018-02-07 | End: 2018-08-08 | Stop reason: SDUPTHER

## 2018-04-27 RX ORDER — ATORVASTATIN CALCIUM 10 MG/1
TABLET, FILM COATED ORAL
Qty: 90 TABLET | Refills: 1 | Status: SHIPPED | OUTPATIENT
Start: 2018-04-27 | End: 2018-10-31 | Stop reason: SDUPTHER

## 2018-07-16 ENCOUNTER — OFFICE VISIT (OUTPATIENT)
Dept: ENDOCRINOLOGY | Age: 63
End: 2018-07-16

## 2018-07-16 VITALS
WEIGHT: 209 LBS | HEIGHT: 72 IN | BODY MASS INDEX: 28.31 KG/M2 | DIASTOLIC BLOOD PRESSURE: 84 MMHG | OXYGEN SATURATION: 98 % | HEART RATE: 67 BPM | SYSTOLIC BLOOD PRESSURE: 132 MMHG

## 2018-07-16 DIAGNOSIS — E11.8 TYPE 2 DIABETES MELLITUS WITH COMPLICATION, WITHOUT LONG-TERM CURRENT USE OF INSULIN (HCC): Primary | ICD-10-CM

## 2018-07-16 DIAGNOSIS — N18.30 STAGE 3 CHRONIC KIDNEY DISEASE (HCC): ICD-10-CM

## 2018-07-16 DIAGNOSIS — E78.5 HYPERLIPIDEMIA, UNSPECIFIED HYPERLIPIDEMIA TYPE: ICD-10-CM

## 2018-07-16 DIAGNOSIS — I10 ESSENTIAL HYPERTENSION: ICD-10-CM

## 2018-07-16 PROCEDURE — 99214 OFFICE O/P EST MOD 30 MIN: CPT | Performed by: INTERNAL MEDICINE

## 2018-07-16 NOTE — PROGRESS NOTES
"Subjective   Jamal Lemus is a 63 y.o. male.     F/u for dm 1, hypertension, hyperlipidemia /  Pt not testing bs / last dm eye exam 12/15/ last dm foot exam 1/15/18 with dr Burk       Diabetes     Hyperlipidemia     Hypertension        He has type 2 diabetes mellitus and has been on Tradjenta 5 mg once a day once a day. He does not check his blood sugars at home. He has gained 3 lbs since 1/18.      His last eye examination was 12/15 and there is no retinopathy. He denies any blurred vision. He denies any paresthesias. He has no microalbuminuria on urine sample taken in 7/17.      He has hypertension and renal insufficiency. He is on Coreg, chlorthalidone, eplerenone, and fosinopril. BP at home < 140 systolic.  He follows with Dr. Johnson. He denies any chest pain, shortness of breath, or pedal edema.      He has hyperlipidemia and has been on Lipitor 10 mg/day. He denies any myalgia. His last meal was 7 AM.      He is retired. He is due for colonoscopy and prostate exam. He does not have a primary care physician yet.  He denies urinary or bowel symptoms.    The following portions of the patient's history were reviewed and updated as appropriate: allergies, current medications, past family history, past medical history, past social history, past surgical history and problem list.    Review of Systems   Constitutional: Negative.    HENT: Negative.    Eyes: Negative.    Respiratory: Negative.    Cardiovascular: Negative.    Gastrointestinal: Negative.    Endocrine: Negative.    Genitourinary: Negative.    Musculoskeletal: Negative.    Skin: Negative.    Allergic/Immunologic: Negative.    Neurological: Negative.    Hematological: Negative.    Psychiatric/Behavioral: Negative.        Objective      Vitals:    07/16/18 0854   BP: 132/84   BP Location: Right arm   Patient Position: Sitting   Cuff Size: Large Adult   Pulse: 67   SpO2: 98%   Weight: 94.8 kg (209 lb)   Height: 182.9 cm (72.01\")     Physical Exam "   Constitutional: He is oriented to person, place, and time. He appears well-developed and well-nourished. No distress.   HENT:   Head: Normocephalic.   Nose: Nose normal.   Mouth/Throat: No oropharyngeal exudate.   Eyes: Conjunctivae and EOM are normal. Right eye exhibits no discharge. Left eye exhibits no discharge. No scleral icterus.   Neck: Normal range of motion. Neck supple. No JVD present. No tracheal deviation present. No thyromegaly present.   Cardiovascular: Normal rate, regular rhythm, normal heart sounds and intact distal pulses.  Exam reveals no gallop and no friction rub.    No murmur heard.  Pulmonary/Chest: Effort normal and breath sounds normal. No respiratory distress. He has no wheezes. He has no rales. He exhibits no tenderness.   Abdominal: Soft. Bowel sounds are normal. He exhibits no distension and no mass. There is no tenderness. There is no guarding.   Musculoskeletal: Normal range of motion. He exhibits no edema, tenderness or deformity.   Lymphadenopathy:     He has no cervical adenopathy.   Neurological: He is alert and oriented to person, place, and time. He displays normal reflexes. Coordination normal.   Intact light touch   Skin: Skin is warm and dry. No rash noted. No erythema.   Psychiatric: He has a normal mood and affect. His behavior is normal.     Office Visit on 01/15/2018   Component Date Value Ref Range Status   • Glucose 01/15/2018 104* 65 - 99 mg/dL Final   • BUN 01/15/2018 28* 8 - 23 mg/dL Final   • Creatinine 01/15/2018 1.61* 0.76 - 1.27 mg/dL Final   • eGFR Non African Am 01/15/2018 44* >60 mL/min/1.73 Final   • eGFR African Am 01/15/2018 53* >60 mL/min/1.73 Final   • BUN/Creatinine Ratio 01/15/2018 17.4  7.0 - 25.0 Final   • Sodium 01/15/2018 135* 136 - 145 mmol/L Final   • Potassium 01/15/2018 4.5  3.5 - 5.2 mmol/L Final   • Chloride 01/15/2018 97* 98 - 107 mmol/L Final   • Total CO2 01/15/2018 23.3  22.0 - 29.0 mmol/L Final   • Calcium 01/15/2018 10.1  8.6 - 10.5  mg/dL Final   • Total Protein 01/15/2018 7.7  6.0 - 8.5 g/dL Final   • Albumin 01/15/2018 4.80  3.50 - 5.20 g/dL Final   • Globulin 01/15/2018 2.9  gm/dL Final   • A/G Ratio 01/15/2018 1.7  g/dL Final   • Total Bilirubin 01/15/2018 0.7  0.1 - 1.2 mg/dL Final   • Alkaline Phosphatase 01/15/2018 93  39 - 117 U/L Final   • AST (SGOT) 01/15/2018 22  1 - 40 U/L Final   • ALT (SGPT) 01/15/2018 30  1 - 41 U/L Final   • Total Cholesterol 01/15/2018 146  0 - 200 mg/dL Final   • Triglycerides 01/15/2018 96  0 - 150 mg/dL Final   • HDL Cholesterol 01/15/2018 59  40 - 60 mg/dL Final   • VLDL Cholesterol 01/15/2018 19.2  5 - 40 mg/dL Final   • LDL Cholesterol  01/15/2018 68  0 - 100 mg/dL Final   • Hemoglobin A1C 01/15/2018 6.71* 4.80 - 5.60 % Final    Comment: Hemoglobin A1C Ranges:  Increased Risk for Diabetes  5.7% to 6.4%  Diabetes                     >= 6.5%  Diabetic Goal                < 7.0%     • TSH 01/15/2018 1.040  0.270 - 4.200 mIU/mL Final   • Free T4 01/15/2018 1.32  0.93 - 1.70 ng/dL Final   • Interpretation 01/15/2018 Note   Final    Comment: -------------------------------  CHRONIC KIDNEY DISEASE:  EGFR, BLOOD PRESSURE, AND PROTEINURIA ASSESSMENT  The overall regression of eGFR with time is not  statistically significant. Current eGFR is 44 mL/min/1.73mE2  corresponding to CKD stage 3b. Multiply eGFR by 1.159 if  patient is . Potassium is within goal and  has not changed significantly, was 4.5 and now is 4.5  mmol/L. Glycemic control (HB A1c: 6.7 %) is within goal.  EGFR, BLOOD PRESSURE, AND PROTEINURIA TREATMENT SUGGESTIONS  -  Guidelines recommend a target blood pressure of 140/90 mmHg  or less in CKD patients to reduce cardiovascular risk and  CKD progression. Assessment of albuminuria (urine  albumin:creatinine ratio or urine protein:creatinine ratio  preferred) is recommended at least annually in CKD patients  for staging and disease prognosis.  EGFR, BLOOD PRESSURE, AND PROTEINURIA  FOLLOW-UP  -  fasting Renal Panel and Hemoglobin A1C within 6 months; Spot  Urine Panel is recommended by KDOQI guideline                           s, at least  yearly;  -  BONE and MINERAL ASSESSMENT  Calcium is within goal and has not changed significantly,  was 10.1 and now is 10.1 mg/dL. Carbon Dioxide is within  goal and has risen, was 19 and now is 23 mmol/L. Guidelines  recommend the measurement of 25-hydroxy vitamin D in  patients with CKD.  BONE and MINERAL TREATMENT SUGGESTIONS  -  Interpretations require simultaneous measurements of serum  calcium and phosphorus.  BONE and MINERAL FOLLOW-UP  -  fasting PTH with Renal Panel and 25-Hydroxy Vitamin D are  recommended by KDOQI guidelines, at least yearly;  -  LIPIDS ASSESSMENT  Blood was non-fasting; interpret these results with caution.  LDL-C is optimal, 68 mg/dL. Triglyceride is normal, 96  mg/dL. Non-HDL Cholesterol is optimal, 87 mg/dL. HDL-C is  normal, 59 mg/dL.  LIPIDS TREATMENT SUGGESTIONS  -  Therapeutic lifestyle changes are always valuable to  maintain optimal blood lipid status (diet, exercise, weight  management). Continue statin if in use. Consider measuremen                           t  of LDL particle number or Apo B to adjudicate need for  further LDL lowering therapy. If statin cannot be tolerated  or increased, alternatives include use of an intestinal  agent (ezetimibe or bile acid sequestrant) or niacin.  LIPIDS FOLLOW-UP  -  fasting Lipid Panel within 12 months;  -  ANEMIA ASSESSMENT  Most recent order does not include a CBC Panel or iron  studies.  ANEMIA FOLLOW-UP  -  CBC is recommended by KDOQI guidelines, at least yearly;  -------------------------------  DISCLAIMER  These assessments and treatment suggestions are provided as  a convenience in support of the physician-patient  relationship and are not intended to replace the physician's  clinical judgment. They are derived from national guidelines  in addition to other evidence and  expert opinion. The  clinician should consider this information within the  context of clinical opinion and the individual patient.  SEE GUIDANCE FOR CHRONIC KIDNEY DISEASE PROGRAM: Kidney  Disease Improving Global Outcomes (K                           DIGO) clinical practice  guidelines are at http://kdigo.org/home/guidelines/.  National Kid[employer] Kidney Disease Outcomes Quality  Initiative (KDOQI (TM)), with its limitations and  disclaimers, are at www.kidney.org/professionals/KDOQI. This  program is intended for patients who have been diagnosed  with stages 3, 4, or pre-dialysis 5 CKD. It is not intended  for children, pregnant patients, or transplant patients.     • Interpretation 01/15/2018 Note   Final    Supplemental report is available.   • PDF Image 01/15/2018 Not applicable   Final   • PDF Image 01/15/2018 Not applicable   Final     Assessment/Plan   Jamal was seen today for diabetes, hyperlipidemia and hypertension.    Diagnoses and all orders for this visit:    Type 2 diabetes mellitus with complication, without long-term current use of insulin (CMS/[facility])  -     Comprehensive Metabolic Panel  -     Hemoglobin A1c  -     TSH  -     T4, Free  -     Microalbumin / Creatinine Urine Ratio - Urine, Clean Catch    Stage 3 chronic kidney disease  -     Comprehensive Metabolic Panel    Hyperlipidemia, unspecified hyperlipidemia type  -     Comprehensive Metabolic Panel  -     Lipid Panel  -     TSH  -     T4, Free    Essential hypertension  -     Comprehensive Metabolic Panel      Continue Tradjenta 5 mg once a day.  Check hemoglobin A1c and urine microalbumin.  Continue Lipitor 10 mg per day.  Check lipid profile.  Continue Coreg, chlorthalidone, eplerenone, and fosinopril.    Send copy of my notes and labs to Dr. Johnson.    RTC 6 mos.

## 2018-07-17 LAB
ALBUMIN SERPL-MCNC: 4.4 G/DL (ref 3.6–4.8)
ALBUMIN/CREAT UR: <3.7 MG/G CREAT (ref 0–30)
ALBUMIN/GLOB SERPL: 1.5 {RATIO} (ref 1.2–2.2)
ALP SERPL-CCNC: 90 IU/L (ref 39–117)
ALT SERPL-CCNC: 37 IU/L (ref 0–44)
AST SERPL-CCNC: 24 IU/L (ref 0–40)
BILIRUB SERPL-MCNC: 0.5 MG/DL (ref 0–1.2)
BUN SERPL-MCNC: 21 MG/DL (ref 8–27)
BUN/CREAT SERPL: 13 (ref 10–24)
CALCIUM SERPL-MCNC: 10.3 MG/DL (ref 8.6–10.2)
CHLORIDE SERPL-SCNC: 103 MMOL/L (ref 96–106)
CHOLEST SERPL-MCNC: 133 MG/DL (ref 100–199)
CO2 SERPL-SCNC: 23 MMOL/L (ref 20–29)
CREAT SERPL-MCNC: 1.66 MG/DL (ref 0.76–1.27)
CREAT UR-MCNC: 82 MG/DL
GLOBULIN SER CALC-MCNC: 2.9 G/DL (ref 1.5–4.5)
GLUCOSE SERPL-MCNC: 119 MG/DL (ref 65–99)
HBA1C MFR BLD: 6.7 % (ref 4.8–5.6)
HDLC SERPL-MCNC: 57 MG/DL
INTERPRETATION: NORMAL
INTERPRETATION: NORMAL
LDLC SERPL CALC-MCNC: 59 MG/DL (ref 0–99)
Lab: NORMAL
MICROALBUMIN UR-MCNC: <3 UG/ML
POTASSIUM SERPL-SCNC: 5.4 MMOL/L (ref 3.5–5.2)
PROT SERPL-MCNC: 7.3 G/DL (ref 6–8.5)
SODIUM SERPL-SCNC: 140 MMOL/L (ref 134–144)
T4 FREE SERPL-MCNC: 1.24 NG/DL (ref 0.82–1.77)
TRIGL SERPL-MCNC: 85 MG/DL (ref 0–149)
TSH SERPL DL<=0.005 MIU/L-ACNC: 1.28 UIU/ML (ref 0.45–4.5)
VLDLC SERPL CALC-MCNC: 17 MG/DL (ref 5–40)

## 2018-07-19 DIAGNOSIS — E87.5 SERUM POTASSIUM ELEVATED: Primary | ICD-10-CM

## 2018-07-23 ENCOUNTER — LAB (OUTPATIENT)
Dept: LAB | Facility: HOSPITAL | Age: 63
End: 2018-07-23

## 2018-07-23 DIAGNOSIS — E87.5 HIGH SERUM POTASSIUM LEVEL: Primary | ICD-10-CM

## 2018-07-23 LAB
ANION GAP SERPL CALCULATED.3IONS-SCNC: 13.3 MMOL/L
BUN BLD-MCNC: 33 MG/DL (ref 8–23)
BUN/CREAT SERPL: 21.6 (ref 7–25)
CALCIUM SPEC-SCNC: 10.4 MG/DL (ref 8.6–10.5)
CHLORIDE SERPL-SCNC: 103 MMOL/L (ref 98–107)
CO2 SERPL-SCNC: 21.7 MMOL/L (ref 22–29)
CREAT BLD-MCNC: 1.53 MG/DL (ref 0.76–1.27)
GFR SERPL CREATININE-BSD FRML MDRD: 46 ML/MIN/1.73
GLUCOSE BLD-MCNC: 108 MG/DL (ref 65–99)
POTASSIUM BLD-SCNC: 4.1 MMOL/L (ref 3.5–5.2)
SODIUM BLD-SCNC: 138 MMOL/L (ref 136–145)

## 2018-07-23 PROCEDURE — 36415 COLL VENOUS BLD VENIPUNCTURE: CPT

## 2018-07-23 PROCEDURE — 80048 BASIC METABOLIC PNL TOTAL CA: CPT

## 2018-07-26 ENCOUNTER — RESULTS ENCOUNTER (OUTPATIENT)
Dept: ENDOCRINOLOGY | Age: 63
End: 2018-07-26

## 2018-07-26 DIAGNOSIS — E87.5 SERUM POTASSIUM ELEVATED: ICD-10-CM

## 2018-08-08 RX ORDER — LINAGLIPTIN 5 MG/1
TABLET, FILM COATED ORAL
Qty: 90 TABLET | Refills: 1 | Status: SHIPPED | OUTPATIENT
Start: 2018-08-08 | End: 2019-01-21 | Stop reason: SDUPTHER

## 2018-10-31 RX ORDER — ATORVASTATIN CALCIUM 10 MG/1
TABLET, FILM COATED ORAL
Qty: 90 TABLET | Refills: 1 | Status: SHIPPED | OUTPATIENT
Start: 2018-10-31 | End: 2019-04-27 | Stop reason: SDUPTHER

## 2019-01-21 ENCOUNTER — OFFICE VISIT (OUTPATIENT)
Dept: ENDOCRINOLOGY | Age: 64
End: 2019-01-21

## 2019-01-21 VITALS
HEART RATE: 62 BPM | BODY MASS INDEX: 28.58 KG/M2 | DIASTOLIC BLOOD PRESSURE: 98 MMHG | WEIGHT: 211 LBS | OXYGEN SATURATION: 95 % | HEIGHT: 72 IN | SYSTOLIC BLOOD PRESSURE: 162 MMHG

## 2019-01-21 DIAGNOSIS — N18.9 CHRONIC KIDNEY DISEASE, UNSPECIFIED CKD STAGE: ICD-10-CM

## 2019-01-21 DIAGNOSIS — E78.5 HYPERLIPIDEMIA, UNSPECIFIED HYPERLIPIDEMIA TYPE: ICD-10-CM

## 2019-01-21 DIAGNOSIS — E11.8 TYPE 2 DIABETES MELLITUS WITH COMPLICATION, WITHOUT LONG-TERM CURRENT USE OF INSULIN (HCC): Primary | ICD-10-CM

## 2019-01-21 DIAGNOSIS — I10 ESSENTIAL HYPERTENSION: ICD-10-CM

## 2019-01-21 PROCEDURE — 99214 OFFICE O/P EST MOD 30 MIN: CPT | Performed by: INTERNAL MEDICINE

## 2019-01-21 NOTE — PROGRESS NOTES
Subjective   Jamal Lemus is a 63 y.o. male.     F/u for dm 1, hypertension, hyperlipidemia / pt not testing bs / last dm eye exam 12/15/ last dm foot exam today with dr Burk / flu vaccine declined       Diabetes   He presents for his follow-up diabetic visit. He has type 2 diabetes mellitus. No MedicAlert identification noted. The initial diagnosis of diabetes was made 5 years ago. Pertinent negatives for hypoglycemia include no confusion, dizziness, headaches, hunger, mood changes, nervousness/anxiousness, pallor, seizures, sleepiness, speech difficulty, sweats or tremors. Pertinent negatives for diabetes include no blurred vision, no chest pain, no fatigue, no foot paresthesias, no foot ulcerations, no polydipsia, no polyphagia, no polyuria, no visual change, no weakness and no weight loss. Pertinent negatives for hypoglycemia complications include no blackouts, no hospitalization, no nocturnal hypoglycemia, no required assistance and no required glucagon injection. Symptoms are stable. Pertinent negatives for diabetic complications include no CVA, heart disease, impotence, nephropathy, peripheral neuropathy, PVD or retinopathy. Risk factors for coronary artery disease include hypertension. Current diabetic treatment includes oral agent (monotherapy). He is compliant with treatment all of the time. His weight is fluctuating minimally. He is following a generally healthy diet. When asked about meal planning, he reported none. He has not had a previous visit with a dietitian. He participates in exercise weekly. There is no compliance with monitoring of blood glucose. He does not see a podiatrist.Eye exam is not current.   Hyperlipidemia   Pertinent negatives include no chest pain.   Hypertension   Pertinent negatives include no blurred vision, chest pain, headaches or sweats. There is no history of CVA, PVD or retinopathy.      He has type 2 diabetes mellitus and has been on Tradjenta 5 mg once a day once a day.  "He does not check his blood sugars at home. He has gained 2 lbs since 1/18.      His last eye examination was 12/15 and there is no retinopathy. He denies any blurred vision. He denies any paresthesias. He has no microalbuminuria on urine sample taken in 7/18.      He has hypertension and renal insufficiency. He is on Coreg, chlorthalidone, eplerenone, and fosinopril. BP at home < 140 systolic.  He follows with Dr. Johnson. He denies any chest pain, shortness of breath, or pedal edema.      He has hyperlipidemia and has been on Lipitor 10 mg/day. He denies any myalgia. His last meal was 6 AM.      He is retired. He is due for colonoscopy and prostate exam. He does not have a primary care physician yet.  He denies urinary or bowel symptoms.    The following portions of the patient's history were reviewed and updated as appropriate: allergies, current medications, past family history, past medical history, past social history, past surgical history and problem list.    Review of Systems   Constitutional: Negative.  Negative for fatigue and weight loss.   HENT: Negative.    Eyes: Negative.  Negative for blurred vision.   Respiratory: Negative.    Cardiovascular: Negative.  Negative for chest pain.   Gastrointestinal: Negative.    Endocrine: Negative.  Negative for polydipsia, polyphagia and polyuria.   Genitourinary: Negative.  Negative for impotence.   Musculoskeletal: Negative.    Skin: Negative for pallor.   Allergic/Immunologic: Negative.    Neurological: Negative.  Negative for dizziness, tremors, seizures, speech difficulty, weakness and headaches.   Hematological: Negative.    Psychiatric/Behavioral: Negative.  Negative for confusion. The patient is not nervous/anxious.        Objective      Vitals:    01/21/19 0902   BP: 162/98   BP Location: Right arm   Patient Position: Sitting   Cuff Size: Large Adult   Pulse: 62   SpO2: 95%   Weight: 95.7 kg (211 lb)   Height: 182.9 cm (72.01\")     Physical Exam "   Constitutional: He is oriented to person, place, and time. He appears well-developed and well-nourished. No distress.   HENT:   Head: Normocephalic.   Nose: Nose normal.   Mouth/Throat: No oropharyngeal exudate.   Eyes: Conjunctivae and EOM are normal. Right eye exhibits no discharge. Left eye exhibits no discharge. No scleral icterus.   Neck: Neck supple. No JVD present. No tracheal deviation present. No thyromegaly present.   Cardiovascular: Normal rate, regular rhythm, normal heart sounds and intact distal pulses. Exam reveals no gallop and no friction rub.   No murmur heard.  Pulmonary/Chest: Effort normal and breath sounds normal. No stridor. No respiratory distress. He has no wheezes. He has no rales. He exhibits no tenderness.   Abdominal: Soft. Bowel sounds are normal. He exhibits no distension and no mass. There is no tenderness. There is no rebound and no guarding.   Musculoskeletal: Normal range of motion. He exhibits no edema, tenderness or deformity.   Lymphadenopathy:     He has no cervical adenopathy.   Neurological: He is alert and oriented to person, place, and time. He displays normal reflexes. He exhibits normal muscle tone. Coordination normal.   Skin: Skin is warm and dry. No rash noted. No erythema.   Psychiatric: He has a normal mood and affect. His behavior is normal.     Lab on 07/23/2018   Component Date Value Ref Range Status   • Glucose 07/23/2018 108* 65 - 99 mg/dL Final   • BUN 07/23/2018 33* 8 - 23 mg/dL Final   • Creatinine 07/23/2018 1.53* 0.76 - 1.27 mg/dL Final   • Sodium 07/23/2018 138  136 - 145 mmol/L Final   • Potassium 07/23/2018 4.1  3.5 - 5.2 mmol/L Final   • Chloride 07/23/2018 103  98 - 107 mmol/L Final   • CO2 07/23/2018 21.7* 22.0 - 29.0 mmol/L Final   • Calcium 07/23/2018 10.4  8.6 - 10.5 mg/dL Final   • eGFR Non  Amer 07/23/2018 46* >60 mL/min/1.73 Final   • BUN/Creatinine Ratio 07/23/2018 21.6  7.0 - 25.0 Final   • Anion Gap 07/23/2018 13.3  mmol/L Final      Assessment/Plan   Jamal was seen today for diabetes, hyperlipidemia and hypertension.    Diagnoses and all orders for this visit:    Type 2 diabetes mellitus with complication, without long-term current use of insulin (CMS/[facility])  -     Comprehensive Metabolic Panel  -     Hemoglobin A1c  -     TSH  -     T4, Free    Chronic kidney disease, unspecified CKD stage  -     Comprehensive Metabolic Panel  -     TSH  -     T4, Free    Essential hypertension  -     Comprehensive Metabolic Panel    Hyperlipidemia, unspecified hyperlipidemia type  -     Comprehensive Metabolic Panel  -     Lipid Panel    Other orders  -     linagliptin (TRADJENTA) 5 MG tablet tablet; Take 1 tablet by mouth Daily.        ContinueTradjenta 5 mg concentrated sweet diet.  Continue Lipitor 10 mg per day.  Continue Coreg, chlorthalidone, eplerenone and fosinopril per Dr. Johnson.  Advised to get a PCP.  Advised to get a colonoscopy and prostate exam.    Send copy of my note to Dr. Johnson.    RTC 6 mos

## 2019-01-22 LAB
ALBUMIN SERPL-MCNC: 4.6 G/DL (ref 3.5–5.2)
ALBUMIN/GLOB SERPL: 1.6 G/DL
ALP SERPL-CCNC: 85 U/L (ref 39–117)
ALT SERPL-CCNC: 37 U/L (ref 1–41)
AST SERPL-CCNC: 23 U/L (ref 1–40)
BILIRUB SERPL-MCNC: 0.7 MG/DL (ref 0.1–1.2)
BUN SERPL-MCNC: 28 MG/DL (ref 8–23)
BUN/CREAT SERPL: 17.6 (ref 7–25)
CALCIUM SERPL-MCNC: 10.3 MG/DL (ref 8.6–10.5)
CHLORIDE SERPL-SCNC: 101 MMOL/L (ref 98–107)
CHOLEST SERPL-MCNC: 125 MG/DL (ref 0–200)
CO2 SERPL-SCNC: 22.8 MMOL/L (ref 22–29)
CREAT SERPL-MCNC: 1.59 MG/DL (ref 0.76–1.27)
GLOBULIN SER CALC-MCNC: 2.9 GM/DL
GLUCOSE SERPL-MCNC: 148 MG/DL (ref 65–99)
HBA1C MFR BLD: 7.3 % (ref 4.8–5.6)
HDLC SERPL-MCNC: 49 MG/DL (ref 40–60)
INTERPRETATION: NORMAL
INTERPRETATION: NORMAL
LDLC SERPL CALC-MCNC: 58 MG/DL (ref 0–100)
Lab: NORMAL
Lab: NORMAL
POTASSIUM SERPL-SCNC: 4.8 MMOL/L (ref 3.5–5.2)
PROT SERPL-MCNC: 7.5 G/DL (ref 6–8.5)
SODIUM SERPL-SCNC: 136 MMOL/L (ref 136–145)
T4 FREE SERPL-MCNC: 1.39 NG/DL (ref 0.93–1.7)
TRIGL SERPL-MCNC: 90 MG/DL (ref 0–150)
TSH SERPL DL<=0.005 MIU/L-ACNC: 1.05 MIU/ML (ref 0.27–4.2)
VLDLC SERPL CALC-MCNC: 18 MG/DL (ref 5–40)

## 2019-04-29 RX ORDER — ATORVASTATIN CALCIUM 10 MG/1
TABLET, FILM COATED ORAL
Qty: 90 TABLET | Refills: 0 | Status: SHIPPED | OUTPATIENT
Start: 2019-04-29 | End: 2019-07-27 | Stop reason: SDUPTHER

## 2019-07-29 RX ORDER — ATORVASTATIN CALCIUM 10 MG/1
TABLET, FILM COATED ORAL
Qty: 90 TABLET | Refills: 0 | Status: SHIPPED | OUTPATIENT
Start: 2019-07-29 | End: 2019-10-26 | Stop reason: SDUPTHER

## 2019-08-09 NOTE — PROGRESS NOTES
Subjective   Jamal Lemus is a 64 y.o. male.     F/u for dm 1, hypertension, hyperlipidemia/pt does not test bs / last dm eye exam Dec 2015/ last dm foot exam 1/21/19 with dr Hayes       Diabetes   He has type 2 diabetes mellitus. No MedicAlert identification noted. The initial diagnosis of diabetes was made 6 years ago. Pertinent negatives for hypoglycemia include no confusion, dizziness, headaches, hunger, mood changes, nervousness/anxiousness, pallor, seizures, sleepiness, speech difficulty, sweats or tremors. Pertinent negatives for diabetes include no blurred vision, no chest pain, no fatigue, no foot paresthesias, no foot ulcerations, no polydipsia, no polyphagia, no polyuria, no visual change, no weakness and no weight loss. Pertinent negatives for hypoglycemia complications include no blackouts, no hospitalization, no nocturnal hypoglycemia, no required assistance and no required glucagon injection. Symptoms are stable. Diabetic complications include nephropathy. Pertinent negatives for diabetic complications include no CVA, heart disease, impotence, peripheral neuropathy, PVD or retinopathy. Risk factors for coronary artery disease include hypertension. Current diabetic treatment includes oral agent (monotherapy). He is compliant with treatment all of the time. His weight is stable. When asked about meal planning, he reported none. He has not had a previous visit with a dietitian. He participates in exercise three times a week. There is no compliance with monitoring of blood glucose. He does not see a podiatrist.Eye exam is not current.        The following portions of the patient's history were reviewed and updated as appropriate: allergies, current medications, past family history, past medical history, past social history, past surgical history and problem list.    Review of Systems   Constitutional: Negative.  Negative for fatigue and weight loss.   HENT: Negative.    Eyes: Negative.  Negative for  "blurred vision.   Respiratory: Negative.    Cardiovascular: Negative.  Negative for chest pain.   Gastrointestinal: Negative.    Endocrine: Negative.  Negative for polydipsia, polyphagia and polyuria.   Genitourinary: Negative.  Negative for impotence.   Musculoskeletal: Negative.    Skin: Negative.  Negative for pallor.   Allergic/Immunologic: Negative.    Neurological: Negative.  Negative for dizziness, tremors, seizures, speech difficulty, weakness and headaches.   Hematological: Negative.    Psychiatric/Behavioral: Negative.  Negative for confusion. The patient is not nervous/anxious.      He has type 2 diabetes mellitus and has been on Tradjenta 5 mg once a day once a day. He does not check his blood sugars at home. He lost 5 pounds since January 2019      His last eye examination was 12/15 and there is no retinopathy. He denies any blurred vision. He denies any paresthesias. He has no microalbuminuria on urine sample taken in 7/18.      He has hypertension and renal insufficiency. He is on Coreg, chlorthalidone, eplerenone, and fosinopril. BP at home <130 systolic.  He follows with Dr. Johnson last saw him about 7 months ago. He denies any chest pain, shortness of breath, or pedal edema.       He has hyperlipidemia and has been on Lipitor 10 mg/day. He denies any myalgia. His last meal was 6 AM.      He is retired. He is due for colonoscopy and prostate exam. He does not have a primary care physician yet.  He denies urinary or bowel symptoms.    Objective      Vitals:    08/12/19 0853 08/12/19 0909   BP: 130/92 130/80   BP Location: Right arm    Patient Position: Sitting    Cuff Size: Large Adult    Pulse: 60    SpO2: 97%    Weight: 93.5 kg (206 lb 3.2 oz)    Height: 182.9 cm (72.01\")      Physical Exam   Constitutional: He is oriented to person, place, and time. He appears well-developed and well-nourished. No distress.   HENT:   Head: Normocephalic.   Right Ear: External ear normal.   Left Ear: External ear " normal.   Nose: Nose normal.   Mouth/Throat: Oropharynx is clear and moist. No oropharyngeal exudate.   Eyes: Conjunctivae and EOM are normal. Right eye exhibits no discharge. Left eye exhibits no discharge. No scleral icterus.   Neck: Normal range of motion. Neck supple. No JVD present. No tracheal deviation present. No thyromegaly present.   Cardiovascular: Normal rate, regular rhythm, normal heart sounds and intact distal pulses. Exam reveals no gallop and no friction rub.   No murmur heard.  Pulmonary/Chest: Effort normal and breath sounds normal. No stridor. No respiratory distress. He has no wheezes. He has no rales. He exhibits no tenderness.   Abdominal: Soft. Bowel sounds are normal. He exhibits no distension and no mass. There is no tenderness. There is no guarding.   Musculoskeletal: Normal range of motion. He exhibits no edema, tenderness or deformity.   Lymphadenopathy:     He has no cervical adenopathy.   Neurological: He is alert and oriented to person, place, and time. He displays normal reflexes. Coordination normal.   Skin: Skin is warm and dry. He is not diaphoretic.   Psychiatric: He has a normal mood and affect. His behavior is normal.     Office Visit on 01/21/2019   Component Date Value Ref Range Status   • Glucose 01/21/2019 148* 65 - 99 mg/dL Final   • BUN 01/21/2019 28* 8 - 23 mg/dL Final   • Creatinine 01/21/2019 1.59* 0.76 - 1.27 mg/dL Final   • eGFR Non African Am 01/21/2019 44* >60 mL/min/1.73 Final   • eGFR African Am 01/21/2019 54* >60 mL/min/1.73 Final   • BUN/Creatinine Ratio 01/21/2019 17.6  7.0 - 25.0 Final   • Sodium 01/21/2019 136  136 - 145 mmol/L Final   • Potassium 01/21/2019 4.8  3.5 - 5.2 mmol/L Final   • Chloride 01/21/2019 101  98 - 107 mmol/L Final   • Total CO2 01/21/2019 22.8  22.0 - 29.0 mmol/L Final   • Calcium 01/21/2019 10.3  8.6 - 10.5 mg/dL Final   • Total Protein 01/21/2019 7.5  6.0 - 8.5 g/dL Final   • Albumin 01/21/2019 4.60  3.50 - 5.20 g/dL Final   •  Globulin 01/21/2019 2.9  gm/dL Final   • A/G Ratio 01/21/2019 1.6  g/dL Final   • Total Bilirubin 01/21/2019 0.7  0.1 - 1.2 mg/dL Final   • Alkaline Phosphatase 01/21/2019 85  39 - 117 U/L Final   • AST (SGOT) 01/21/2019 23  1 - 40 U/L Final   • ALT (SGPT) 01/21/2019 37  1 - 41 U/L Final   • Total Cholesterol 01/21/2019 125  0 - 200 mg/dL Final   • Triglycerides 01/21/2019 90  0 - 150 mg/dL Final   • HDL Cholesterol 01/21/2019 49  40 - 60 mg/dL Final   • VLDL Cholesterol 01/21/2019 18  5 - 40 mg/dL Final   • LDL Cholesterol  01/21/2019 58  0 - 100 mg/dL Final   • Hemoglobin A1C 01/21/2019 7.30* 4.80 - 5.60 % Final    Comment: Hemoglobin A1C Ranges:  Increased Risk for Diabetes  5.7% to 6.4%  Diabetes                     >= 6.5%  Diabetic Goal                < 7.0%     • TSH 01/21/2019 1.050  0.270 - 4.200 mIU/mL Final   • Free T4 01/21/2019 1.39  0.93 - 1.70 ng/dL Final   • Interpretation 01/21/2019 Note   Final    Comment: -------------------------------  CHRONIC KIDNEY DISEASE:  EGFR, BLOOD PRESSURE, AND PROTEINURIA ASSESSMENT  The overall regression of eGFR with time is not  statistically significant. Current eGFR is 44 mL/min/1.73mE2  corresponding to CKD stage 3b. Multiply eGFR by 1.159 if  patient is . Potassium is within goal and  has decreased, was 5.4 and now is 4.8 mmol/L. Glycemic  control (HB A1c: 7.3 %) is above goal but may be appropriate  if patient is at risk for hypoglycemia.  EGFR, BLOOD PRESSURE, AND PROTEINURIA TREATMENT SUGGESTIONS  -  Guidelines recommend a target blood pressure of 140/90 mmHg  or less in CKD patients to reduce cardiovascular risk and  CKD progression. Assessment of albuminuria (urine  albumin:creatinine ratio or urine protein:creatinine ratio  preferred) is recommended at least annually in CKD patients  for staging and disease prognosis.  EGFR, BLOOD PRESSURE, AND PROTEINURIA FOLLOW-UP  -  fasting Renal Panel within 6 months; Spot Urine Panel  (Album                            in preferred) is recommended by KDOQI guidelines, at  least yearly; Hemoglobin A1C within 3 months;  -  BONE and MINERAL ASSESSMENT  Calcium is within goal and has not changed significantly,  was 10.3 and now is 10.3 mg/dL. Carbon Dioxide is within  goal and has not changed significantly, was 23 and now is 23  mmol/L. Guidelines recommend the measurement of 25-hydroxy  vitamin D in patients with CKD.  BONE and MINERAL TREATMENT SUGGESTIONS  -  Interpretations require simultaneous measurements of serum  calcium and phosphorus.  BONE and MINERAL FOLLOW-UP  -  fasting PTH with Renal Panel and 25-Hydroxy Vitamin D are  recommended by KDOQI guidelines, at least yearly;  -  LIPIDS ASSESSMENT  Blood was non-fasting; interpret these results with caution.  LDL-C is optimal and has not changed significantly, was 59  and now is 58 mg/dL. Triglyceride is normal and has not  changed significantly, was 85 and now is 90 mg/dL. Non-HDL  Cholesterol is optimal and has not changed significantly,  was 76                            and now is 76 mg/dL. HDL-C is normal and has  decreased, was 57 and now is 49 mg/dL.  LIPIDS TREATMENT SUGGESTIONS  -  Therapeutic lifestyle changes are always valuable to  maintain optimal blood lipid status (diet, exercise, weight  management). Continue statin if in use. Consider measurement  of LDL particle number or Apo B to adjudicate need for  further LDL lowering therapy. If statin cannot be tolerated  or increased, alternatives include use of an intestinal  agent (ezetimibe or bile acid sequestrant) or niacin.  LIPIDS FOLLOW-UP  -  fasting Lipid Panel within 12 months;  -  ANEMIA ASSESSMENT  Most recent order does not include a CBC Panel or iron  studies.  ANEMIA FOLLOW-UP  -  CBC is recommended by KDOQI guidelines, at least yearly;  -------------------------------  DISCLAIMER  These assessments and treatment suggestions are provided as  a convenience in support of the  physician-patient  relationship and are not intended to replace the physician's  clinical judgment. They are                            derived from national guidelines  in addition to other evidence and expert opinion. The  clinician should consider this information within the  context of clinical opinion and the individual patient.  SEE GUIDANCE FOR CHRONIC KIDNEY DISEASE PROGRAM: Kidney  Disease Improving Global Outcomes (KDIGO) clinical practice  guidelines are at http://kdigo.org/home/guidelines/.  [employer] Kidney Disease Outcomes Quality  Initiative (KDOQI (TM)), with its limitations and  disclaimers, are at www.kidney.org/professionals/KDOQI. This  program is intended for patients who have been diagnosed  with stages 3, 4, or pre-dialysis 5 CKD. It is not intended  for children, pregnant patients, or transplant patients.     • Interpretation 01/21/2019 Note   Final    Supplemental report is available.   • PDF Image 01/21/2019 Not applicable   Final   • PDF Image 01/21/2019 Not applicable   Final     Assessment/Plan   Jamal was seen today for diabetes, hyperlipidemia and hypertension.    Diagnoses and all orders for this visit:    Type 2 diabetes mellitus with complication, without long-term current use of insulin (CMS/[facility])  -     Comprehensive Metabolic Panel  -     Hemoglobin A1c  -     TSH  -     Microalbumin / Creatinine Urine Ratio - Urine, Clean Catch    Essential hypertension  -     Comprehensive Metabolic Panel    Hyperlipidemia, unspecified hyperlipidemia type  -     Comprehensive Metabolic Panel  -     Lipid Panel  -     TSH      Continue Tradjenta 5 mg/day.  Advised to have a yearly eye exam  Continue Coreg, chlorthalidone, eplerenone, and fosinopril per Dr. Johnson  Continue Lipitor 10 mg/day.  Advised to get a PCP.  Advised to get colonoscopy and prostate exam.  Flu vaccine this fall.    Copy of my note sent to Dr. Johnson.    RTC 6 mos.

## 2019-08-12 ENCOUNTER — OFFICE VISIT (OUTPATIENT)
Dept: ENDOCRINOLOGY | Age: 64
End: 2019-08-12

## 2019-08-12 VITALS
SYSTOLIC BLOOD PRESSURE: 130 MMHG | WEIGHT: 206.2 LBS | OXYGEN SATURATION: 97 % | HEART RATE: 60 BPM | DIASTOLIC BLOOD PRESSURE: 80 MMHG | HEIGHT: 72 IN | BODY MASS INDEX: 27.93 KG/M2

## 2019-08-12 DIAGNOSIS — E11.8 TYPE 2 DIABETES MELLITUS WITH COMPLICATION, WITHOUT LONG-TERM CURRENT USE OF INSULIN (HCC): Primary | ICD-10-CM

## 2019-08-12 DIAGNOSIS — E78.5 HYPERLIPIDEMIA, UNSPECIFIED HYPERLIPIDEMIA TYPE: ICD-10-CM

## 2019-08-12 DIAGNOSIS — I10 ESSENTIAL HYPERTENSION: ICD-10-CM

## 2019-08-12 PROCEDURE — 99214 OFFICE O/P EST MOD 30 MIN: CPT | Performed by: INTERNAL MEDICINE

## 2019-08-16 LAB
ALBUMIN SERPL-MCNC: 4.4 G/DL (ref 3.5–5.2)
ALBUMIN/CREAT UR: <7.5 MG/G CREAT (ref 0–30)
ALBUMIN/GLOB SERPL: 1.8 G/DL
ALP SERPL-CCNC: 90 U/L (ref 39–117)
ALT SERPL-CCNC: 29 U/L (ref 1–41)
AST SERPL-CCNC: 17 U/L (ref 1–40)
BILIRUB SERPL-MCNC: 0.6 MG/DL (ref 0.2–1.2)
BUN SERPL-MCNC: 29 MG/DL (ref 8–23)
BUN/CREAT SERPL: 16.7 (ref 7–25)
CALCIUM SERPL-MCNC: 9.5 MG/DL (ref 8.6–10.5)
CHLORIDE SERPL-SCNC: 101 MMOL/L (ref 98–107)
CHOLEST SERPL-MCNC: 118 MG/DL (ref 0–200)
CO2 SERPL-SCNC: 24.2 MMOL/L (ref 22–29)
CREAT SERPL-MCNC: 1.74 MG/DL (ref 0.76–1.27)
CREAT UR-MCNC: 40.2 MG/DL
GLOBULIN SER CALC-MCNC: 2.4 GM/DL
GLUCOSE SERPL-MCNC: 141 MG/DL (ref 65–99)
HBA1C MFR BLD: 6.8 % (ref 4.8–5.6)
HDLC SERPL-MCNC: 51 MG/DL (ref 40–60)
INTERPRETATION: NORMAL
LDLC SERPL CALC-MCNC: 53 MG/DL (ref 0–100)
Lab: NORMAL
MICROALBUMIN UR-MCNC: <3 UG/ML
POTASSIUM SERPL-SCNC: 4.5 MMOL/L (ref 3.5–5.2)
PROT SERPL-MCNC: 6.8 G/DL (ref 6–8.5)
SODIUM SERPL-SCNC: 138 MMOL/L (ref 136–145)
TRIGL SERPL-MCNC: 68 MG/DL (ref 0–150)
TSH SERPL DL<=0.005 MIU/L-ACNC: 0.88 MIU/ML (ref 0.27–4.2)
VLDLC SERPL CALC-MCNC: 13.6 MG/DL

## 2019-10-28 RX ORDER — ATORVASTATIN CALCIUM 10 MG/1
TABLET, FILM COATED ORAL
Qty: 90 TABLET | Refills: 0 | Status: SHIPPED | OUTPATIENT
Start: 2019-10-28 | End: 2020-01-25

## 2020-01-25 RX ORDER — ATORVASTATIN CALCIUM 10 MG/1
TABLET, FILM COATED ORAL
Qty: 90 TABLET | Refills: 0 | Status: SHIPPED | OUTPATIENT
Start: 2020-01-25 | End: 2020-04-27

## 2020-01-29 DIAGNOSIS — R68.89 ABNORMAL ENDOCRINE LABORATORY TEST FINDING: ICD-10-CM

## 2020-01-29 DIAGNOSIS — E78.5 HYPERLIPIDEMIA, UNSPECIFIED HYPERLIPIDEMIA TYPE: ICD-10-CM

## 2020-01-29 DIAGNOSIS — E11.8 TYPE 2 DIABETES MELLITUS WITH COMPLICATION, WITHOUT LONG-TERM CURRENT USE OF INSULIN (HCC): Primary | ICD-10-CM

## 2020-01-29 DIAGNOSIS — I10 ESSENTIAL HYPERTENSION: ICD-10-CM

## 2020-02-03 ENCOUNTER — RESULTS ENCOUNTER (OUTPATIENT)
Dept: ENDOCRINOLOGY | Age: 65
End: 2020-02-03

## 2020-02-03 DIAGNOSIS — R68.89 ABNORMAL ENDOCRINE LABORATORY TEST FINDING: ICD-10-CM

## 2020-02-03 DIAGNOSIS — E11.8 TYPE 2 DIABETES MELLITUS WITH COMPLICATION, WITHOUT LONG-TERM CURRENT USE OF INSULIN (HCC): ICD-10-CM

## 2020-02-03 DIAGNOSIS — E78.5 HYPERLIPIDEMIA, UNSPECIFIED HYPERLIPIDEMIA TYPE: ICD-10-CM

## 2020-02-03 DIAGNOSIS — I10 ESSENTIAL HYPERTENSION: ICD-10-CM

## 2020-02-04 LAB
ALBUMIN SERPL-MCNC: 4.3 G/DL (ref 3.5–5.2)
ALBUMIN/GLOB SERPL: 2 G/DL
ALP SERPL-CCNC: 111 U/L (ref 39–117)
ALT SERPL-CCNC: 42 U/L (ref 1–41)
AST SERPL-CCNC: 20 U/L (ref 1–40)
BILIRUB SERPL-MCNC: 0.6 MG/DL (ref 0.2–1.2)
BUN SERPL-MCNC: 33 MG/DL (ref 8–23)
BUN/CREAT SERPL: 19.8 (ref 7–25)
CALCIUM SERPL-MCNC: 9.7 MG/DL (ref 8.6–10.5)
CHLORIDE SERPL-SCNC: 101 MMOL/L (ref 98–107)
CHOLEST SERPL-MCNC: 119 MG/DL (ref 0–200)
CO2 SERPL-SCNC: 22.3 MMOL/L (ref 22–29)
CREAT SERPL-MCNC: 1.67 MG/DL (ref 0.76–1.27)
GLOBULIN SER CALC-MCNC: 2.2 GM/DL
GLUCOSE SERPL-MCNC: 202 MG/DL (ref 65–99)
HBA1C MFR BLD: 8.4 % (ref 4.8–5.6)
HDLC SERPL-MCNC: 49 MG/DL (ref 40–60)
INTERPRETATION: NORMAL
LDLC SERPL CALC-MCNC: 54 MG/DL (ref 0–100)
Lab: NORMAL
POTASSIUM SERPL-SCNC: 4.4 MMOL/L (ref 3.5–5.2)
PROT SERPL-MCNC: 6.5 G/DL (ref 6–8.5)
SODIUM SERPL-SCNC: 136 MMOL/L (ref 136–145)
TRIGL SERPL-MCNC: 82 MG/DL (ref 0–150)
TSH SERPL DL<=0.005 MIU/L-ACNC: 1.54 UIU/ML (ref 0.27–4.2)
VLDLC SERPL CALC-MCNC: 16.4 MG/DL

## 2020-02-10 NOTE — PROGRESS NOTES
Subjective   Jamal Lemus is a 64 y.o. male.     F/u for dm 1, hypertension, hyperlipidemia/ pt does not test bs/ last dm eye exam jan 2020/ last dm foot exam today with dr Hayes / flu vaccine declined      He has type 2 diabetes mellitus and was switched from Tradjenta to Januvia 50 mg once a day in February 2020 because of insurance.  He does not check his blood sugars at home.  He has gained 1 pound since August 2019.  He admits to dietary noncompliance.  Hemoglobin A1c done in February 2020 has risen to 8.4%.      His last eye examination was 2/20 and there is no retinopathy. He denies any blurred vision. He denies any paresthesias. He has no microalbuminuria on urine sample taken in August 2019.      He has hypertension and renal insufficiency. He is on Coreg, chlorthalidone, eplerenone, and fosinopril. He follows with Dr. Johnson last saw him in 2019. He denies any chest pain, shortness of breath, or pedal edema.       He has hyperlipidemia and has been on Lipitor 10 mg/day. He denies any myalgia.  Lipid profile done in February 2020 are as follows: Cholesterol 119.  HDL 49.  LDL 54.  Triglycerides 82.      He is retired. He is due for colonoscopy and prostate exam. He does not have a primary care physician yet.  He denies urinary or bowel symptoms.  He has no family history of colon or prostate cancer.       The following portions of the patient's history were reviewed and updated as appropriate: allergies, current medications, past family history, past medical history, past social history, past surgical history and problem list.    Review of Systems   Constitutional: Negative.    HENT: Negative.    Eyes: Negative.    Cardiovascular: Negative.    Gastrointestinal: Negative.    Endocrine: Negative for cold intolerance, heat intolerance, polydipsia and polyphagia.   Genitourinary: Negative.    Musculoskeletal: Negative for arthralgias and myalgias.   Neurological: Negative for dizziness, weakness and numbness.  "    Objective      Vitals:    02/17/20 0900   BP: 134/82   BP Location: Right arm   Patient Position: Sitting   Cuff Size: Large Adult   Pulse: 58   SpO2: 98%   Weight: 96.3 kg (212 lb 3.2 oz)   Height: 182.9 cm (72.01\")     Physical Exam   Constitutional: He is oriented to person, place, and time. He appears well-developed and well-nourished. No distress.   HENT:   Head: Normocephalic.   Right Ear: External ear normal.   Left Ear: External ear normal.   Nose: Nose normal.   Mouth/Throat: Oropharynx is clear and moist. No oropharyngeal exudate.   Eyes: Conjunctivae and EOM are normal. Right eye exhibits no discharge. Left eye exhibits no discharge. No scleral icterus.   Neck: Normal range of motion. Neck supple. No tracheal deviation present. No thyromegaly present.   Cardiovascular: Normal rate, regular rhythm, normal heart sounds and intact distal pulses. Exam reveals no gallop and no friction rub.   No murmur heard.  Pulmonary/Chest: Effort normal and breath sounds normal. No stridor. No respiratory distress. He has no wheezes. He has no rales. He exhibits no tenderness.   Abdominal: Soft. Bowel sounds are normal. He exhibits no distension and no mass. There is no tenderness. There is no guarding.   Musculoskeletal: Normal range of motion. He exhibits no edema, tenderness or deformity.   Lymphadenopathy:     He has no cervical adenopathy.   Neurological: He is alert and oriented to person, place, and time. He displays normal reflexes.   Skin: Skin is warm and dry. No rash noted. No erythema. No pallor.   Psychiatric: He has a normal mood and affect. His behavior is normal.     Results Encounter on 02/03/2020   Component Date Value Ref Range Status   • Glucose 02/03/2020 202* 65 - 99 mg/dL Final   • BUN 02/03/2020 33* 8 - 23 mg/dL Final   • Creatinine 02/03/2020 1.67* 0.76 - 1.27 mg/dL Final   • eGFR Non African Am 02/03/2020 42* >60 mL/min/1.73 Final   • eGFR African Am 02/03/2020 50* >60 mL/min/1.73 Final   • " BUN/Creatinine Ratio 02/03/2020 19.8  7.0 - 25.0 Final   • Sodium 02/03/2020 136  136 - 145 mmol/L Final   • Potassium 02/03/2020 4.4  3.5 - 5.2 mmol/L Final   • Chloride 02/03/2020 101  98 - 107 mmol/L Final   • Total CO2 02/03/2020 22.3  22.0 - 29.0 mmol/L Final   • Calcium 02/03/2020 9.7  8.6 - 10.5 mg/dL Final   • Total Protein 02/03/2020 6.5  6.0 - 8.5 g/dL Final   • Albumin 02/03/2020 4.30  3.50 - 5.20 g/dL Final   • Globulin 02/03/2020 2.2  gm/dL Final   • A/G Ratio 02/03/2020 2.0  g/dL Final   • Total Bilirubin 02/03/2020 0.6  0.2 - 1.2 mg/dL Final   • Alkaline Phosphatase 02/03/2020 111  39 - 117 U/L Final   • AST (SGOT) 02/03/2020 20  1 - 40 U/L Final   • ALT (SGPT) 02/03/2020 42* 1 - 41 U/L Final   • Total Cholesterol 02/03/2020 119  0 - 200 mg/dL Final   • Triglycerides 02/03/2020 82  0 - 150 mg/dL Final   • HDL Cholesterol 02/03/2020 49  40 - 60 mg/dL Final   • VLDL Cholesterol 02/03/2020 16.4  mg/dL Final   • LDL Cholesterol  02/03/2020 54  0 - 100 mg/dL Final   • Hemoglobin A1C 02/03/2020 8.40* 4.80 - 5.60 % Final    Comment: Hemoglobin A1C Ranges:  Increased Risk for Diabetes  5.7% to 6.4%  Diabetes                     >= 6.5%  Diabetic Goal                < 7.0%     • TSH 02/03/2020 1.540  0.270 - 4.200 uIU/mL Final   • Interpretation 02/03/2020 Note   Final    Supplemental report is available.   • PDF Image 02/03/2020 Not applicable   Final     Assessment/Plan   Jamal was seen today for diabetes, hypertension and hyperlipidemia.    Diagnoses and all orders for this visit:    Type 2 diabetes mellitus with complication, without long-term current use of insulin (CMS/[facility])    Stage 3 chronic kidney disease (CMS/[facility])    Essential hypertension    Hyperlipidemia, unspecified hyperlipidemia type    Other orders  -     glimepiride (AMARYL) 1 MG tablet; Take 1 tablet by mouth Every Morning.      Start glimepiride 1 mg every morning.  Continue Januvia 50 mg/day.  Continue Coreg, chlorthalidone, eplerenone, and  fosinopril.  Follow-up with Dr. Johnson.  Advised to get a PCP.  Advised to get get colonoscopy and prostate exam.    Copy of note sent to Dr. Johnson.    RTC 4 mos.

## 2020-02-17 ENCOUNTER — OFFICE VISIT (OUTPATIENT)
Dept: ENDOCRINOLOGY | Age: 65
End: 2020-02-17

## 2020-02-17 VITALS
HEART RATE: 58 BPM | OXYGEN SATURATION: 98 % | BODY MASS INDEX: 28.74 KG/M2 | SYSTOLIC BLOOD PRESSURE: 134 MMHG | WEIGHT: 212.2 LBS | HEIGHT: 72 IN | DIASTOLIC BLOOD PRESSURE: 82 MMHG

## 2020-02-17 DIAGNOSIS — I10 ESSENTIAL HYPERTENSION: ICD-10-CM

## 2020-02-17 DIAGNOSIS — E11.8 TYPE 2 DIABETES MELLITUS WITH COMPLICATION, WITHOUT LONG-TERM CURRENT USE OF INSULIN (HCC): Primary | ICD-10-CM

## 2020-02-17 DIAGNOSIS — N18.30 STAGE 3 CHRONIC KIDNEY DISEASE (HCC): ICD-10-CM

## 2020-02-17 DIAGNOSIS — E78.5 HYPERLIPIDEMIA, UNSPECIFIED HYPERLIPIDEMIA TYPE: ICD-10-CM

## 2020-02-17 PROCEDURE — 99214 OFFICE O/P EST MOD 30 MIN: CPT | Performed by: INTERNAL MEDICINE

## 2020-02-17 RX ORDER — GLIMEPIRIDE 1 MG/1
1 TABLET ORAL EVERY MORNING
Qty: 90 TABLET | Refills: 2 | Status: SHIPPED | OUTPATIENT
Start: 2020-02-17 | End: 2020-10-26

## 2020-02-17 RX ORDER — GLIMEPIRIDE 1 MG/1
1 TABLET ORAL EVERY MORNING
Qty: 30 TABLET | Refills: 6 | Status: SHIPPED | OUTPATIENT
Start: 2020-02-17 | End: 2020-02-17

## 2020-02-17 RX ORDER — GLIMEPIRIDE 1 MG/1
1 TABLET ORAL EVERY MORNING
Qty: 30 TABLET | Refills: 6 | Status: SHIPPED | OUTPATIENT
Start: 2020-02-17 | End: 2020-02-17 | Stop reason: SDUPTHER

## 2020-04-27 RX ORDER — SITAGLIPTIN 50 MG/1
TABLET, FILM COATED ORAL
Qty: 90 TABLET | Refills: 0 | Status: SHIPPED | OUTPATIENT
Start: 2020-04-27 | End: 2020-08-03

## 2020-04-27 RX ORDER — ATORVASTATIN CALCIUM 10 MG/1
TABLET, FILM COATED ORAL
Qty: 90 TABLET | Refills: 1 | Status: SHIPPED | OUTPATIENT
Start: 2020-04-27 | End: 2020-10-26

## 2020-08-03 RX ORDER — SITAGLIPTIN 50 MG/1
TABLET, FILM COATED ORAL
Qty: 90 TABLET | Refills: 1 | Status: SHIPPED | OUTPATIENT
Start: 2020-08-03 | End: 2021-01-25

## 2020-08-07 NOTE — PROGRESS NOTES
Subjective   Jamal Lemus is a 65 y.o. male.     F/u for dm 1, hypertension, hyperlipidemia/ pt does not test bs / last dm eye exam Jan 2020/ last dm foot exam 2/17/20 with dr Hayes      He has type 2 diabetes mellitus and was switched from Tradjenta to Januvia 50 mg once a day in February 2020 because of insurance.  He does not check his blood sugars at home.  He has lost 5 lbs since 2/20.        His last eye examination was 2/20 and there is no retinopathy. He denies any blurred vision. He denies any paresthesias. He has no microalbuminuria on urine sample taken in August 2019.      He has hypertension and renal insufficiency. He is on Coreg, chlorthalidone, eplerenone, and fosinopril. He follows with Dr. Johnson last saw him in 3/20. He denies any chest pain, shortness of breath, or pedal edema.       He has hyperlipidemia and has been on Lipitor 10 mg/day. He denies any myalgia.        He is retired. He is due for colonoscopy and prostate exam. He does not have a primary care physician yet.  He denies urinary or bowel symptoms.  He has no family history of colon or prostate cancer.     The following portions of the patient's history were reviewed and updated as appropriate: allergies, current medications, past family history, past medical history, past social history, past surgical history and problem list.    Review of Systems   Constitutional: Negative for chills, fatigue and fever.   HENT: Negative.    Eyes: Negative.    Respiratory: Negative.  Negative for chest tightness, shortness of breath and wheezing.    Cardiovascular: Negative.  Negative for chest pain, palpitations and leg swelling.   Gastrointestinal: Negative for abdominal distention, abdominal pain, blood in stool, constipation and diarrhea.   Endocrine: Negative.  Negative for cold intolerance, heat intolerance and polyuria.   Genitourinary: Negative.  Negative for difficulty urinating, frequency and urgency.   Musculoskeletal: Negative for back  "pain, joint swelling, myalgias and neck pain.   Neurological: Positive for dizziness. Negative for tremors, seizures, weakness, light-headedness, numbness and headaches.   Hematological: Negative.  Negative for adenopathy. Does not bruise/bleed easily.   Psychiatric/Behavioral: Negative.  Negative for agitation and confusion. The patient is not nervous/anxious.      Objective      Vitals:    08/17/20 0853   BP: 148/82   BP Location: Right arm   Patient Position: Sitting   Cuff Size: Large Adult   Pulse: 61   SpO2: 99%   Weight: 93.9 kg (207 lb)   Height: 182.9 cm (72.01\")     Physical Exam   Constitutional: He is oriented to person, place, and time. He appears well-developed and well-nourished. No distress.   HENT:   Head: Normocephalic.   Right Ear: External ear normal.   Left Ear: External ear normal.   Mouth/Throat: No oropharyngeal exudate.   Eyes: Conjunctivae and EOM are normal. Right eye exhibits no discharge. Left eye exhibits no discharge. No scleral icterus.   Neck: Neck supple. No JVD present. No tracheal deviation present. No thyromegaly present.   Cardiovascular: Normal rate, regular rhythm, normal heart sounds and intact distal pulses. Exam reveals no gallop and no friction rub.   No murmur heard.  Pulmonary/Chest: Effort normal and breath sounds normal. No stridor. No respiratory distress. He has no wheezes. He has no rales. He exhibits no tenderness.   Abdominal: Soft. Bowel sounds are normal. He exhibits no distension and no mass. There is no tenderness. There is no guarding.   Musculoskeletal: Normal range of motion. He exhibits no edema, tenderness or deformity.   Lymphadenopathy:     He has no cervical adenopathy.   Neurological: He is alert and oriented to person, place, and time. He displays normal reflexes. Coordination normal.   Intact light touch on lower ext   Skin: Skin is warm and dry. No rash noted. No erythema.   Psychiatric: He has a normal mood and affect. His behavior is normal. "     Results Encounter on 02/03/2020   Component Date Value Ref Range Status   • Glucose 02/03/2020 202* 65 - 99 mg/dL Final   • BUN 02/03/2020 33* 8 - 23 mg/dL Final   • Creatinine 02/03/2020 1.67* 0.76 - 1.27 mg/dL Final   • eGFR Non African Am 02/03/2020 42* >60 mL/min/1.73 Final   • eGFR African Am 02/03/2020 50* >60 mL/min/1.73 Final   • BUN/Creatinine Ratio 02/03/2020 19.8  7.0 - 25.0 Final   • Sodium 02/03/2020 136  136 - 145 mmol/L Final   • Potassium 02/03/2020 4.4  3.5 - 5.2 mmol/L Final   • Chloride 02/03/2020 101  98 - 107 mmol/L Final   • Total CO2 02/03/2020 22.3  22.0 - 29.0 mmol/L Final   • Calcium 02/03/2020 9.7  8.6 - 10.5 mg/dL Final   • Total Protein 02/03/2020 6.5  6.0 - 8.5 g/dL Final   • Albumin 02/03/2020 4.30  3.50 - 5.20 g/dL Final   • Globulin 02/03/2020 2.2  gm/dL Final   • A/G Ratio 02/03/2020 2.0  g/dL Final   • Total Bilirubin 02/03/2020 0.6  0.2 - 1.2 mg/dL Final   • Alkaline Phosphatase 02/03/2020 111  39 - 117 U/L Final   • AST (SGOT) 02/03/2020 20  1 - 40 U/L Final   • ALT (SGPT) 02/03/2020 42* 1 - 41 U/L Final   • Total Cholesterol 02/03/2020 119  0 - 200 mg/dL Final   • Triglycerides 02/03/2020 82  0 - 150 mg/dL Final   • HDL Cholesterol 02/03/2020 49  40 - 60 mg/dL Final   • VLDL Cholesterol 02/03/2020 16.4  mg/dL Final   • LDL Cholesterol  02/03/2020 54  0 - 100 mg/dL Final   • Hemoglobin A1C 02/03/2020 8.40* 4.80 - 5.60 % Final    Comment: Hemoglobin A1C Ranges:  Increased Risk for Diabetes  5.7% to 6.4%  Diabetes                     >= 6.5%  Diabetic Goal                < 7.0%     • TSH 02/03/2020 1.540  0.270 - 4.200 uIU/mL Final   • Interpretation 02/03/2020 Note   Final    Supplemental report is available.   • PDF Image 02/03/2020 Not applicable   Final     Assessment/Plan   Jamal was seen today for diabetes, hypertension and hyperlipidemia.    Diagnoses and all orders for this visit:    Type 2 diabetes mellitus with complication, without long-term current use of insulin  (CMS/[facility])  -     Comprehensive Metabolic Panel  -     Lipid Panel  -     Hemoglobin A1c  -     TSH  -     Microalbumin / Creatinine Urine Ratio - Urine, Clean Catch    Essential hypertension  -     Comprehensive Metabolic Panel    Hyperlipidemia, unspecified hyperlipidemia type  -     Comprehensive Metabolic Panel  -     Lipid Panel    Stage 3 chronic kidney disease (CMS/[facility])  -     Comprehensive Metabolic Panel      Continue Januvia 50 mg/day.  Continue Coreg, chlorthalidone, eplerenone, and fosinopril.  Continue Lipitor 10 mg/day.  Advised to have colonoscopy and a see PCP.  Advised to flu vac and pneumovax    RTC 4 mos.

## 2020-08-17 ENCOUNTER — OFFICE VISIT (OUTPATIENT)
Dept: ENDOCRINOLOGY | Age: 65
End: 2020-08-17

## 2020-08-17 VITALS
BODY MASS INDEX: 28.04 KG/M2 | HEART RATE: 61 BPM | HEIGHT: 72 IN | SYSTOLIC BLOOD PRESSURE: 140 MMHG | DIASTOLIC BLOOD PRESSURE: 80 MMHG | WEIGHT: 207 LBS | OXYGEN SATURATION: 99 %

## 2020-08-17 DIAGNOSIS — E11.8 TYPE 2 DIABETES MELLITUS WITH COMPLICATION, WITHOUT LONG-TERM CURRENT USE OF INSULIN (HCC): Primary | ICD-10-CM

## 2020-08-17 DIAGNOSIS — N18.30 STAGE 3 CHRONIC KIDNEY DISEASE (HCC): ICD-10-CM

## 2020-08-17 DIAGNOSIS — E78.5 HYPERLIPIDEMIA, UNSPECIFIED HYPERLIPIDEMIA TYPE: ICD-10-CM

## 2020-08-17 DIAGNOSIS — I10 ESSENTIAL HYPERTENSION: ICD-10-CM

## 2020-08-17 PROCEDURE — 99214 OFFICE O/P EST MOD 30 MIN: CPT | Performed by: INTERNAL MEDICINE

## 2020-08-18 LAB
ALBUMIN SERPL-MCNC: 4.8 G/DL (ref 3.5–5.2)
ALBUMIN/CREAT UR: <5 MG/G CREAT (ref 0–29)
ALBUMIN/GLOB SERPL: 2.4 G/DL
ALP SERPL-CCNC: 90 U/L (ref 39–117)
ALT SERPL-CCNC: 31 U/L (ref 1–41)
AST SERPL-CCNC: 21 U/L (ref 1–40)
BILIRUB SERPL-MCNC: 0.6 MG/DL (ref 0–1.2)
BUN SERPL-MCNC: 27 MG/DL (ref 8–23)
BUN/CREAT SERPL: 15.3 (ref 7–25)
CALCIUM SERPL-MCNC: 9.7 MG/DL (ref 8.6–10.5)
CHLORIDE SERPL-SCNC: 103 MMOL/L (ref 98–107)
CHOLEST SERPL-MCNC: 128 MG/DL (ref 0–200)
CO2 SERPL-SCNC: 23.2 MMOL/L (ref 22–29)
CREAT SERPL-MCNC: 1.77 MG/DL (ref 0.76–1.27)
CREAT UR-MCNC: 63 MG/DL
GLOBULIN SER CALC-MCNC: 2 GM/DL
GLUCOSE SERPL-MCNC: 97 MG/DL (ref 65–99)
HBA1C MFR BLD: 6 % (ref 4.8–5.6)
HDLC SERPL-MCNC: 54 MG/DL (ref 40–60)
INTERPRETATION: NORMAL
INTERPRETATION: NORMAL
LDLC SERPL CALC-MCNC: 59 MG/DL (ref 0–100)
Lab: NORMAL
Lab: NORMAL
MICROALBUMIN UR-MCNC: <3 UG/ML
POTASSIUM SERPL-SCNC: 4.4 MMOL/L (ref 3.5–5.2)
PROT SERPL-MCNC: 6.8 G/DL (ref 6–8.5)
SODIUM SERPL-SCNC: 138 MMOL/L (ref 136–145)
TRIGL SERPL-MCNC: 74 MG/DL (ref 0–150)
TSH SERPL DL<=0.005 MIU/L-ACNC: 1.37 UIU/ML (ref 0.27–4.2)
VLDLC SERPL CALC-MCNC: 14.8 MG/DL

## 2020-10-26 RX ORDER — GLIMEPIRIDE 1 MG/1
TABLET ORAL
Qty: 90 TABLET | Refills: 2 | Status: SHIPPED | OUTPATIENT
Start: 2020-10-26 | End: 2021-07-19

## 2020-10-26 RX ORDER — ATORVASTATIN CALCIUM 10 MG/1
TABLET, FILM COATED ORAL
Qty: 90 TABLET | Refills: 2 | Status: SHIPPED | OUTPATIENT
Start: 2020-10-26 | End: 2021-01-26 | Stop reason: SDUPTHER

## 2021-01-25 RX ORDER — SITAGLIPTIN 50 MG/1
TABLET, FILM COATED ORAL
Qty: 90 TABLET | Refills: 1 | Status: SHIPPED | OUTPATIENT
Start: 2021-01-25 | End: 2021-07-20

## 2021-01-26 RX ORDER — ATORVASTATIN CALCIUM 10 MG/1
10 TABLET, FILM COATED ORAL DAILY
Qty: 90 TABLET | Refills: 1 | Status: SHIPPED | OUTPATIENT
Start: 2021-01-26 | End: 2021-07-20

## 2021-02-15 NOTE — PROGRESS NOTES
Subjective   Jamal Lemus is a 65 y.o. male.     F/u for dm 1, hypertension, hyperlipidemia/ pt doesn't test bs /last dm eye exam jan 2020/ last dm foot exam today with dr Hayes      He has type 2 diabetes mellitus and was switched from Tradjenta to Januvia 50 mg once a day in February 2020 because of insurance.  He does not check his blood sugars at home.  He has gained 7 lbs since 8/20.  His last meal was at 7 AM.      His last eye examination was 2/20 and there is no retinopathy. He denies any blurred vision. He denies any paresthesias. He has no microalbuminuria on urine sample taken in August 2020      He has hypertension and renal insufficiency. He is on Coreg, chlorthalidone, eplerenone, and fosinopril. He follows with Dr. Johnson last saw him in 3/20. He denies any chest pain, shortness of breath, or pedal edema.       He has hyperlipidemia and has been on Lipitor 10 mg/day. He denies any myalgia.        He is retired. He is due for colonoscopy and prostate exam. He does not have a primary care physician yet.  He denies urinary or bowel symptoms.  He has no family history of colon or prostate cancer.    The following portions of the patient's history were reviewed and updated as appropriate: allergies, current medications, past family history, past medical history, past social history, past surgical history and problem list.    Review of Systems   Constitutional: Negative.    Respiratory: Negative for shortness of breath and wheezing.    Cardiovascular: Negative for chest pain and palpitations.   Gastrointestinal: Negative.    Endocrine: Negative for cold intolerance.   Genitourinary: Negative.    Musculoskeletal: Negative for myalgias.     Objective   Physical Exam  Constitutional:       General: He is not in acute distress.     Appearance: Normal appearance. He is obese. He is not ill-appearing, toxic-appearing or diaphoretic.   Eyes:      General: No scleral icterus.        Right eye: No discharge.          Left eye: No discharge.      Extraocular Movements: Extraocular movements intact.      Conjunctiva/sclera: Conjunctivae normal.   Neck:      Musculoskeletal: Neck supple. No neck rigidity or muscular tenderness.      Vascular: No carotid bruit.   Cardiovascular:      Rate and Rhythm: Normal rate and regular rhythm.      Heart sounds: Normal heart sounds. No murmur. No friction rub. No gallop.    Pulmonary:      Effort: Pulmonary effort is normal. No respiratory distress.      Breath sounds: Normal breath sounds. No stridor. No wheezing, rhonchi or rales.   Chest:      Chest wall: No tenderness.   Abdominal:      General: Bowel sounds are normal. There is no distension.      Palpations: Abdomen is soft. There is no mass.      Tenderness: There is no abdominal tenderness. There is no guarding or rebound.      Hernia: No hernia is present.   Musculoskeletal: Normal range of motion.      Comments: No plantar ulcer   Lymphadenopathy:      Cervical: No cervical adenopathy.   Skin:     General: Skin is warm and dry.      Coloration: Skin is not jaundiced or pale.   Neurological:      General: No focal deficit present.      Mental Status: He is alert and oriented to person, place, and time.      Comments: Intact light touch on lower ext   Psychiatric:         Mood and Affect: Mood normal.         Behavior: Behavior normal.       Office Visit on 08/17/2020   Component Date Value Ref Range Status   • Glucose 08/17/2020 97  65 - 99 mg/dL Final   • BUN 08/17/2020 27* 8 - 23 mg/dL Final   • Creatinine 08/17/2020 1.77* 0.76 - 1.27 mg/dL Final   • eGFR Non African Am 08/17/2020 39* >60 mL/min/1.73 Final   • eGFR African Am 08/17/2020 47* >60 mL/min/1.73 Final   • BUN/Creatinine Ratio 08/17/2020 15.3  7.0 - 25.0 Final   • Sodium 08/17/2020 138  136 - 145 mmol/L Final   • Potassium 08/17/2020 4.4  3.5 - 5.2 mmol/L Final   • Chloride 08/17/2020 103  98 - 107 mmol/L Final   • Total CO2 08/17/2020 23.2  22.0 - 29.0 mmol/L Final   •  Calcium 08/17/2020 9.7  8.6 - 10.5 mg/dL Final   • Total Protein 08/17/2020 6.8  6.0 - 8.5 g/dL Final   • Albumin 08/17/2020 4.80  3.50 - 5.20 g/dL Final   • Globulin 08/17/2020 2.0  gm/dL Final   • A/G Ratio 08/17/2020 2.4  g/dL Final   • Total Bilirubin 08/17/2020 0.6  0.0 - 1.2 mg/dL Final   • Alkaline Phosphatase 08/17/2020 90  39 - 117 U/L Final   • AST (SGOT) 08/17/2020 21  1 - 40 U/L Final   • ALT (SGPT) 08/17/2020 31  1 - 41 U/L Final   • Total Cholesterol 08/17/2020 128  0 - 200 mg/dL Final   • Triglycerides 08/17/2020 74  0 - 150 mg/dL Final   • HDL Cholesterol 08/17/2020 54  40 - 60 mg/dL Final   • VLDL Cholesterol 08/17/2020 14.8  mg/dL Final   • LDL Cholesterol  08/17/2020 59  0 - 100 mg/dL Final   • Hemoglobin A1C 08/17/2020 6.00* 4.80 - 5.60 % Final    Comment: Hemoglobin A1C Ranges:  Increased Risk for Diabetes  5.7% to 6.4%  Diabetes                     >= 6.5%  Diabetic Goal                < 7.0%     • TSH 08/17/2020 1.370  0.270 - 4.200 uIU/mL Final   • Creatinine, Urine 08/17/2020 63.0  Not Estab. mg/dL Final   • Microalbumin, Urine 08/17/2020 <3.0  Not Estab. ug/mL Final   • Microalbumin/Creatinine Ratio 08/17/2020 <5  0 - 29 mg/g creat Final    Comment:                        Normal:                0 -  29                         Moderately increased: 30 - 300                         Severely increased:       >300                **Please note reference interval change**     • Interpretation 08/17/2020 Note   Final    Comment: -------------------------------  CHRONIC KIDNEY DISEASE:  EGFR, BLOOD PRESSURE, AND PROTEINURIA ASSESSMENT  Estimated GFR is falling significantly with time; annualized  rate of decline (all values) is -1.1 mL/min/year, p = 0.01.  Current eGFR is 39 mL/min/1.73mE2 corresponding to CKD stage  3b. Multiply eGFR by 1.159 if patient is .  Estimated interval to CKD 5 (using most recent eGFR values)  is 273 months (67% CI = 191 - 427.2 months). Potassium  is  within goal and has not changed significantly, was 4.4 and  now is 4.4 mmol/L. Urine albumin is undetectable. Glycemic  control (HB A1c: 6.0 %) is within goal.  EGFR, BLOOD PRESSURE, AND PROTEINURIA TREATMENT SUGGESTIONS  -  Based upon current eGFR, patient is at high risk for adverse  outcomes such as CKD progression, CVD, and mortality.  Guidelines recommend a target blood pressure of 140/90 mmHg  or less in CKD patients to reduce cardiovascular risk and  CKD progression.  EGFR, BLOOD PRESSURE, AND PROTEINURIA FOLLOW-UP                             -  Spot Urine Panel (Albumin preferred) within 12 months;  fasting Renal Panel and Hemoglobin A1C within 6 months;  -  BONE and MINERAL ASSESSMENT  Calcium is within goal and has not changed significantly,  was 9.7 and now is 9.7 mg/dL. Carbon Dioxide is within goal  and has not changed significantly, was 22 and now is 23  mmol/L. Guidelines recommend the measurement of 25-hydroxy  vitamin D in patients with CKD.  BONE and MINERAL TREATMENT SUGGESTIONS  -  Interpretations require simultaneous measurements of serum  calcium and phosphorus.  BONE and MINERAL FOLLOW-UP  -  fasting PTH with Renal Panel and 25-Hydroxy Vitamin D are  recommended by KDOQI guidelines, at least yearly;  -  LIPIDS ASSESSMENT  LDL-C is optimal and has not changed significantly, was 54  and now is 59 mg/dL. Triglyceride is normal and has not  changed significantly, was 82 and now is 74 mg/dL. Non-HDL  Cholesterol is optimal and has not changed significantly,  was 70 and now is 74 mg/dL. HDL-C is normal and has r                           isen,  was 49 and now is 54 mg/dL.  LIPIDS TREATMENT SUGGESTIONS  -  Therapeutic lifestyle changes are always valuable to  maintain optimal blood lipid status (diet, exercise, weight  management). Continue statin if in use. Consider measurement  of LDL particle number or Apo B to adjudicate need for  further LDL lowering therapy. If statin cannot be  tolerated  or increased, alternatives include use of an intestinal  agent (ezetimibe or bile acid sequestrant) or niacin.  LIPIDS FOLLOW-UP  -  fasting Lipid Panel within 12 months;  -  ANEMIA ASSESSMENT  Most recent order does not include a CBC Panel or iron  studies.  ANEMIA FOLLOW-UP  -  CBC is recommended by KDOQI guidelines, at least yearly;  -------------------------------  DISCLAIMER  These assessments and treatment suggestions are provided as  a convenience in support of the physician-patient  relationship and are not intended to replace the physician's  clinical judgment. They are derived from national guidelines  in addition to ot                           her evidence and expert opinion. The  clinician should consider this information within the  context of clinical opinion and the individual patient.  SEE GUIDANCE FOR CHRONIC KIDNEY DISEASE PROGRAM: Kidney  Disease Improving Global Outcomes (KDIGO) clinical practice  guidelines are at http://kdigo.org/home/guidelines/.  [employer] Kidney Disease Outcomes Quality  Initiative (KDOQI (TM)), with its limitations and  disclaimers, are at www.kidney.org/professionals/KDOQI. This  program is intended for patients who have been diagnosed  with stages 3, 4, or pre-dialysis 5 CKD. It is not intended  for children, pregnant patients, or transplant patients.     • Interpretation 08/17/2020 Note   Final    Supplemental report is available.   • PDF Image 08/17/2020 Not applicable   Final   • PDF Image 08/17/2020 Not applicable   Final     Assessment/Plan   Diagnoses and all orders for this visit:    1. Type 2 diabetes mellitus with complication, without long-term current use of insulin (CMS/[facility]) (Primary)  -     Comprehensive Metabolic Panel  -     Lipid Panel  -     Hemoglobin A1c  -     TSH  -     T4, Free    2. Essential hypertension  -     Comprehensive Metabolic Panel    3. Hyperlipidemia, unspecified hyperlipidemia type  -     Comprehensive  Metabolic Panel  -     Lipid Panel  -     TSH  -     T4, Free    4. Stage 3b chronic kidney disease (CMS/HCC)  -     Comprehensive Metabolic Panel      Continue Januvia 50 mg/day  Advised to have a yearly eye exam.  Continue Coreg, chlorthalidone, eplerenone and lisinopril.  Will defer blood pressure control to Dr. Johnson.  Continue Lipitor 10 mg/day.  Advised to get a PCP and have a colonoscopy and prostate exam.    Copy of my note sent to Dr. Johnson    RTC 4 mos

## 2021-02-22 ENCOUNTER — OFFICE VISIT (OUTPATIENT)
Dept: ENDOCRINOLOGY | Age: 66
End: 2021-02-22

## 2021-02-22 VITALS
OXYGEN SATURATION: 99 % | DIASTOLIC BLOOD PRESSURE: 92 MMHG | HEIGHT: 72 IN | WEIGHT: 214.6 LBS | SYSTOLIC BLOOD PRESSURE: 144 MMHG | HEART RATE: 70 BPM | BODY MASS INDEX: 29.07 KG/M2

## 2021-02-22 DIAGNOSIS — N18.32 STAGE 3B CHRONIC KIDNEY DISEASE (HCC): ICD-10-CM

## 2021-02-22 DIAGNOSIS — E78.5 HYPERLIPIDEMIA, UNSPECIFIED HYPERLIPIDEMIA TYPE: ICD-10-CM

## 2021-02-22 DIAGNOSIS — I10 ESSENTIAL HYPERTENSION: ICD-10-CM

## 2021-02-22 DIAGNOSIS — E11.8 TYPE 2 DIABETES MELLITUS WITH COMPLICATION, WITHOUT LONG-TERM CURRENT USE OF INSULIN (HCC): Primary | ICD-10-CM

## 2021-02-22 PROCEDURE — 99214 OFFICE O/P EST MOD 30 MIN: CPT | Performed by: INTERNAL MEDICINE

## 2021-02-23 LAB
ALBUMIN SERPL-MCNC: 4.5 G/DL (ref 3.5–5.2)
ALBUMIN/GLOB SERPL: 1.8 G/DL
ALP SERPL-CCNC: 99 U/L (ref 39–117)
ALT SERPL-CCNC: 40 U/L (ref 1–41)
AST SERPL-CCNC: 27 U/L (ref 1–40)
BILIRUB SERPL-MCNC: 0.6 MG/DL (ref 0–1.2)
BUN SERPL-MCNC: 22 MG/DL (ref 8–23)
BUN/CREAT SERPL: 13.6 (ref 7–25)
CALCIUM SERPL-MCNC: 10.1 MG/DL (ref 8.6–10.5)
CHLORIDE SERPL-SCNC: 103 MMOL/L (ref 98–107)
CHOLEST SERPL-MCNC: 122 MG/DL (ref 0–200)
CO2 SERPL-SCNC: 27.9 MMOL/L (ref 22–29)
CREAT SERPL-MCNC: 1.62 MG/DL (ref 0.76–1.27)
GLOBULIN SER CALC-MCNC: 2.5 GM/DL
GLUCOSE SERPL-MCNC: 120 MG/DL (ref 65–99)
HBA1C MFR BLD: 6.9 % (ref 4.8–5.6)
HDLC SERPL-MCNC: 55 MG/DL (ref 40–60)
INTERPRETATION: NORMAL
LDLC SERPL CALC-MCNC: 50 MG/DL (ref 0–100)
Lab: NORMAL
POTASSIUM SERPL-SCNC: 5.1 MMOL/L (ref 3.5–5.2)
PROT SERPL-MCNC: 7 G/DL (ref 6–8.5)
SODIUM SERPL-SCNC: 138 MMOL/L (ref 136–145)
T4 FREE SERPL-MCNC: 1.29 NG/DL (ref 0.93–1.7)
TRIGL SERPL-MCNC: 89 MG/DL (ref 0–150)
TSH SERPL DL<=0.005 MIU/L-ACNC: 1.16 UIU/ML (ref 0.27–4.2)
VLDLC SERPL CALC-MCNC: 17 MG/DL (ref 5–40)

## 2021-03-19 ENCOUNTER — BULK ORDERING (OUTPATIENT)
Dept: CASE MANAGEMENT | Facility: OTHER | Age: 66
End: 2021-03-19

## 2021-03-19 DIAGNOSIS — Z23 IMMUNIZATION DUE: ICD-10-CM

## 2021-07-20 RX ORDER — ATORVASTATIN CALCIUM 10 MG/1
TABLET, FILM COATED ORAL
Qty: 90 TABLET | Refills: 1 | Status: SHIPPED | OUTPATIENT
Start: 2021-07-20 | End: 2022-01-18

## 2021-07-20 RX ORDER — SITAGLIPTIN 50 MG/1
TABLET, FILM COATED ORAL
Qty: 90 TABLET | Refills: 1 | Status: SHIPPED | OUTPATIENT
Start: 2021-07-20 | End: 2022-01-18

## 2021-07-20 RX ORDER — GLIMEPIRIDE 1 MG/1
TABLET ORAL
Qty: 90 TABLET | Refills: 1 | Status: SHIPPED | OUTPATIENT
Start: 2021-07-20 | End: 2022-01-18

## 2021-08-04 DIAGNOSIS — I10 ESSENTIAL HYPERTENSION: ICD-10-CM

## 2021-08-04 DIAGNOSIS — E78.5 HYPERLIPIDEMIA, UNSPECIFIED HYPERLIPIDEMIA TYPE: ICD-10-CM

## 2021-08-04 DIAGNOSIS — N18.32 STAGE 3B CHRONIC KIDNEY DISEASE (HCC): ICD-10-CM

## 2021-08-04 DIAGNOSIS — E11.8 TYPE 2 DIABETES MELLITUS WITH COMPLICATION, WITHOUT LONG-TERM CURRENT USE OF INSULIN (HCC): Primary | ICD-10-CM

## 2021-08-12 ENCOUNTER — LAB (OUTPATIENT)
Dept: ENDOCRINOLOGY | Age: 66
End: 2021-08-12

## 2021-08-12 DIAGNOSIS — N18.32 STAGE 3B CHRONIC KIDNEY DISEASE (HCC): ICD-10-CM

## 2021-08-12 DIAGNOSIS — E11.8 TYPE 2 DIABETES MELLITUS WITH COMPLICATION, WITHOUT LONG-TERM CURRENT USE OF INSULIN (HCC): ICD-10-CM

## 2021-08-12 DIAGNOSIS — I10 ESSENTIAL HYPERTENSION: ICD-10-CM

## 2021-08-12 DIAGNOSIS — E78.5 HYPERLIPIDEMIA, UNSPECIFIED HYPERLIPIDEMIA TYPE: ICD-10-CM

## 2021-08-13 LAB
ALBUMIN SERPL-MCNC: 4.3 G/DL (ref 3.5–5.2)
ALBUMIN/CREAT UR: <3 MG/G CREAT (ref 0–29)
ALBUMIN/GLOB SERPL: 1.6 G/DL
ALP SERPL-CCNC: 124 U/L (ref 39–117)
ALT SERPL-CCNC: 38 U/L (ref 1–41)
AST SERPL-CCNC: 25 U/L (ref 1–40)
BILIRUB SERPL-MCNC: 0.7 MG/DL (ref 0–1.2)
BUN SERPL-MCNC: 42 MG/DL (ref 8–23)
BUN/CREAT SERPL: 18.3 (ref 7–25)
CALCIUM SERPL-MCNC: 9.8 MG/DL (ref 8.6–10.5)
CHLORIDE SERPL-SCNC: 100 MMOL/L (ref 98–107)
CHOLEST SERPL-MCNC: 135 MG/DL (ref 0–200)
CO2 SERPL-SCNC: 24.8 MMOL/L (ref 22–29)
CREAT SERPL-MCNC: 2.29 MG/DL (ref 0.76–1.27)
CREAT UR-MCNC: 103 MG/DL
GLOBULIN SER CALC-MCNC: 2.7 GM/DL
GLUCOSE SERPL-MCNC: 175 MG/DL (ref 65–99)
HBA1C MFR BLD: 6.9 % (ref 4.8–5.6)
HDLC SERPL-MCNC: 51 MG/DL (ref 40–60)
IMP & REVIEW OF LAB RESULTS: NORMAL
LDLC SERPL CALC-MCNC: 67 MG/DL (ref 0–100)
MICROALBUMIN UR-MCNC: <3 UG/ML
POTASSIUM SERPL-SCNC: 4.6 MMOL/L (ref 3.5–5.2)
PROT SERPL-MCNC: 7 G/DL (ref 6–8.5)
SODIUM SERPL-SCNC: 135 MMOL/L (ref 136–145)
T4 FREE SERPL-MCNC: 1.43 NG/DL (ref 0.93–1.7)
TRIGL SERPL-MCNC: 92 MG/DL (ref 0–150)
TSH SERPL DL<=0.005 MIU/L-ACNC: 1.28 UIU/ML (ref 0.27–4.2)
VLDLC SERPL CALC-MCNC: 17 MG/DL (ref 5–40)

## 2021-08-18 NOTE — PROGRESS NOTES
Subjective   Jamal Lemus is a 66 y.o. male.     F/u for dm 1, hypertension, hyperlipidemia/ pt doesn't test bs /last dm eye exam jan 2020/ last dm foot exam 2/22/21 with dr Hayes      He has type 2 diabetes mellitus and was switched from Tradjenta to Januvia 50 mg once a day in February 2020 because of insurance.  He is on glimepiride 1 mg/day.  He does not check his blood sugars at home.  He has lost 4 pounds since February 2021.      His last eye examination was 2/20 and there is no retinopathy. He denies any blurred vision. He denies any paresthesias. He has no microalbuminuria on urine sample taken in August 2021      He has hypertension and renal insufficiency. He is on Coreg, chlorthalidone, eplerenone, and fosinopril. He follows with Dr. Johnson last saw him in 3/21. He denies any chest pain, shortness of breath, or pedal edema.       He has hyperlipidemia and has been on Lipitor 10 mg/day. He denies any myalgia.  Lipid panel done in August 2021 are as follows: Cholesterol 135.  HDL 51.  LDL 67.  Triglycerides 92.      He is retired. He is due for colonoscopy and prostate exam. He does not have a primary care physician yet.  He denies urinary or bowel symptoms.  He has no family history of colon or prostate cancer.    He had 2 Moderna Covid vaccines without major side effects.      The following portions of the patient's history were reviewed and updated as appropriate: allergies, current medications, past family history, past medical history, past social history, past surgical history and problem list.    Review of Systems   Eyes: Negative for visual disturbance.   Respiratory: Negative for shortness of breath.    Cardiovascular: Negative for chest pain and palpitations.   Gastrointestinal: Negative.    Endocrine: Negative.    Genitourinary: Negative.    Musculoskeletal: Negative for myalgias.   Neurological: Negative for numbness.     Objective      Vitals:    08/26/21 0902   BP: 132/82   BP Location: Right  "arm   Patient Position: Sitting   Cuff Size: Large Adult   Pulse: 59   SpO2: 98%   Weight: 95.4 kg (210 lb 6.4 oz)   Height: 182.9 cm (72.01\")     Physical Exam  Constitutional:       General: He is not in acute distress.     Appearance: Normal appearance. He is obese. He is not ill-appearing, toxic-appearing or diaphoretic.   Eyes:      General: No scleral icterus.        Right eye: No discharge.         Left eye: No discharge.      Extraocular Movements: Extraocular movements intact.      Conjunctiva/sclera: Conjunctivae normal.   Neck:      Vascular: No carotid bruit.   Cardiovascular:      Rate and Rhythm: Normal rate and regular rhythm.      Pulses: Normal pulses.      Heart sounds: Normal heart sounds. No murmur heard.   No friction rub. No gallop.    Pulmonary:      Effort: No respiratory distress.      Breath sounds: Normal breath sounds. No stridor. No wheezing, rhonchi or rales.   Chest:      Chest wall: No tenderness.   Abdominal:      General: Bowel sounds are normal. There is no distension.      Palpations: Abdomen is soft. There is no mass.      Tenderness: There is no abdominal tenderness. There is no right CVA tenderness, left CVA tenderness or guarding.      Hernia: No hernia is present.   Musculoskeletal:         General: No swelling or tenderness. Normal range of motion.      Cervical back: Normal range of motion. No rigidity or tenderness.      Right lower leg: No edema.      Left lower leg: No edema.   Lymphadenopathy:      Cervical: No cervical adenopathy.   Skin:     General: Skin is warm.      Coloration: Skin is not jaundiced.   Neurological:      General: No focal deficit present.      Mental Status: He is alert and oriented to person, place, and time.      Comments: Intact light touch on lower ext   Psychiatric:         Mood and Affect: Mood normal.         Behavior: Behavior normal.       Lab on 08/12/2021   Component Date Value Ref Range Status   • Creatinine, Urine 08/12/2021 103.0  Not " Estab. mg/dL Final   • Microalbumin, Urine 08/12/2021 <3.0  Not Estab. ug/mL Final    **Verified by repeat analysis**   • Microalbumin/Creatinine Ratio 08/12/2021 <3  0 - 29 mg/g creat Final    Comment:                        Normal:                0 -  29                         Moderately increased: 30 - 300                         Severely increased:       >300     • TSH 08/12/2021 1.280  0.270 - 4.200 uIU/mL Final   • Free T4 08/12/2021 1.43  0.93 - 1.70 ng/dL Final    Results may be falsely increased if patient taking Biotin.   • Total Cholesterol 08/12/2021 135  0 - 200 mg/dL Final    Comment: Cholesterol Reference Ranges  (U.S. Department of Health and Human Services ATP III  Classifications)  Desirable          <200 mg/dL  Borderline High    200-239 mg/dL  High Risk          >240 mg/dL  Triglyceride Reference Ranges  (U.S. Department of Health and Human Services ATP III  Classifications)  Normal           <150 mg/dL  Borderline High  150-199 mg/dL  High             200-499 mg/dL  Very High        >500 mg/dL  HDL Reference Ranges  (U.S. Department of Health and Human Services ATP III  Classifcations)  Low     <40 mg/dl (major risk factor for CHD)  High    >60 mg/dl ('negative' risk factor for CHD)  LDL Reference Ranges  (U.S. Department of Health and Human Services ATP III  Classifcations)  Optimal          <100 mg/dL  Near Optimal     100-129 mg/dL  Borderline High  130-159 mg/dL  High             160-189 mg/dL  Very High        >189 mg/dL     • Triglycerides 08/12/2021 92  0 - 150 mg/dL Final   • HDL Cholesterol 08/12/2021 51  40 - 60 mg/dL Final   • VLDL Cholesterol Sj 08/12/2021 17  5 - 40 mg/dL Final   • LDL Chol Calc ([facility]) 08/12/2021 67  0 - 100 mg/dL Final   • Glucose 08/12/2021 175* 65 - 99 mg/dL Final   • BUN 08/12/2021 42* 8 - 23 mg/dL Final   • Creatinine 08/12/2021 2.29* 0.76 - 1.27 mg/dL Final   • eGFR Non African Am 08/12/2021 29* >60 mL/min/1.73 Final    Comment: GFR Normal >60  Chronic Kidney  Disease <60  Kidney Failure <15     • eGFR  Am 08/12/2021 35* >60 mL/min/1.73 Final   • BUN/Creatinine Ratio 08/12/2021 18.3  7.0 - 25.0 Final   • Sodium 08/12/2021 135* 136 - 145 mmol/L Final   • Potassium 08/12/2021 4.6  3.5 - 5.2 mmol/L Final   • Chloride 08/12/2021 100  98 - 107 mmol/L Final   • Total CO2 08/12/2021 24.8  22.0 - 29.0 mmol/L Final   • Calcium 08/12/2021 9.8  8.6 - 10.5 mg/dL Final   • Total Protein 08/12/2021 7.0  6.0 - 8.5 g/dL Final   • Albumin 08/12/2021 4.30  3.50 - 5.20 g/dL Final   • Globulin 08/12/2021 2.7  gm/dL Final   • A/G Ratio 08/12/2021 1.6  g/dL Final   • Total Bilirubin 08/12/2021 0.7  0.0 - 1.2 mg/dL Final   • Alkaline Phosphatase 08/12/2021 124* 39 - 117 U/L Final   • AST (SGOT) 08/12/2021 25  1 - 40 U/L Final   • ALT (SGPT) 08/12/2021 38  1 - 41 U/L Final   • Hemoglobin A1C 08/12/2021 6.90* 4.80 - 5.60 % Final    Comment: Hemoglobin A1C Ranges:  Increased Risk for Diabetes  5.7% to 6.4%  Diabetes                     >= 6.5%  Diabetic Goal                < 7.0%     • Interpretation 08/12/2021 Note   Final    Supplemental report is available.     Assessment/Plan   Diagnoses and all orders for this visit:    1. Type 2 diabetes mellitus with complication, without long-term current use of insulin (CMS/[facility]) (Primary)    2. Essential hypertension    3. Hyperlipidemia, unspecified hyperlipidemia type    4. Stage 3b chronic kidney disease (CMS/HCC)      Continue Januvia and glimeperide.  Continue fosinopril, Coreg, chlorthalidone and Inspra.  Follow-up with Dr. Johnson as scheduled.  Continue Lipitor.    Copy of my note sent to Dr. Johnson.    RTC 4 mos.

## 2021-08-26 ENCOUNTER — OFFICE VISIT (OUTPATIENT)
Dept: ENDOCRINOLOGY | Age: 66
End: 2021-08-26

## 2021-08-26 VITALS
HEIGHT: 72 IN | SYSTOLIC BLOOD PRESSURE: 132 MMHG | HEART RATE: 59 BPM | WEIGHT: 210.4 LBS | BODY MASS INDEX: 28.5 KG/M2 | OXYGEN SATURATION: 98 % | DIASTOLIC BLOOD PRESSURE: 82 MMHG

## 2021-08-26 DIAGNOSIS — N18.32 STAGE 3B CHRONIC KIDNEY DISEASE (HCC): ICD-10-CM

## 2021-08-26 DIAGNOSIS — I10 ESSENTIAL HYPERTENSION: ICD-10-CM

## 2021-08-26 DIAGNOSIS — E78.5 HYPERLIPIDEMIA, UNSPECIFIED HYPERLIPIDEMIA TYPE: ICD-10-CM

## 2021-08-26 DIAGNOSIS — E11.8 TYPE 2 DIABETES MELLITUS WITH COMPLICATION, WITHOUT LONG-TERM CURRENT USE OF INSULIN (HCC): Primary | ICD-10-CM

## 2021-08-26 PROCEDURE — 99214 OFFICE O/P EST MOD 30 MIN: CPT | Performed by: INTERNAL MEDICINE

## 2022-01-18 RX ORDER — SITAGLIPTIN 50 MG/1
TABLET, FILM COATED ORAL
Qty: 90 TABLET | Refills: 1 | Status: SHIPPED | OUTPATIENT
Start: 2022-01-18 | End: 2022-07-19

## 2022-01-18 RX ORDER — ATORVASTATIN CALCIUM 10 MG/1
TABLET, FILM COATED ORAL
Qty: 90 TABLET | Refills: 1 | Status: SHIPPED | OUTPATIENT
Start: 2022-01-18 | End: 2022-07-19

## 2022-01-18 RX ORDER — GLIMEPIRIDE 1 MG/1
TABLET ORAL
Qty: 90 TABLET | Refills: 1 | Status: SHIPPED | OUTPATIENT
Start: 2022-01-18 | End: 2022-07-19

## 2022-02-24 DIAGNOSIS — E78.5 HYPERLIPIDEMIA, UNSPECIFIED HYPERLIPIDEMIA TYPE: ICD-10-CM

## 2022-02-24 DIAGNOSIS — E11.8 TYPE 2 DIABETES MELLITUS WITH COMPLICATION, WITHOUT LONG-TERM CURRENT USE OF INSULIN: Primary | ICD-10-CM

## 2022-02-24 DIAGNOSIS — I10 ESSENTIAL HYPERTENSION: ICD-10-CM

## 2022-03-17 ENCOUNTER — LAB (OUTPATIENT)
Dept: ENDOCRINOLOGY | Age: 67
End: 2022-03-17

## 2022-03-17 DIAGNOSIS — E78.5 HYPERLIPIDEMIA, UNSPECIFIED HYPERLIPIDEMIA TYPE: ICD-10-CM

## 2022-03-17 DIAGNOSIS — E11.8 TYPE 2 DIABETES MELLITUS WITH COMPLICATION, WITHOUT LONG-TERM CURRENT USE OF INSULIN: ICD-10-CM

## 2022-03-17 DIAGNOSIS — I10 ESSENTIAL HYPERTENSION: ICD-10-CM

## 2022-03-18 LAB
ALBUMIN SERPL-MCNC: 4.5 G/DL (ref 3.8–4.8)
ALBUMIN/GLOB SERPL: 2 {RATIO} (ref 1.2–2.2)
ALP SERPL-CCNC: 103 IU/L (ref 44–121)
ALT SERPL-CCNC: 35 IU/L (ref 0–44)
AST SERPL-CCNC: 22 IU/L (ref 0–40)
BILIRUB SERPL-MCNC: 0.5 MG/DL (ref 0–1.2)
BUN SERPL-MCNC: 31 MG/DL (ref 8–27)
BUN/CREAT SERPL: 18 (ref 10–24)
CALCIUM SERPL-MCNC: 9.8 MG/DL (ref 8.6–10.2)
CHLORIDE SERPL-SCNC: 101 MMOL/L (ref 96–106)
CHOLEST SERPL-MCNC: 129 MG/DL (ref 100–199)
CO2 SERPL-SCNC: 22 MMOL/L (ref 20–29)
CREAT SERPL-MCNC: 1.7 MG/DL (ref 0.76–1.27)
EGFRCR SERPLBLD CKD-EPI 2021: 44 ML/MIN/1.73
GLOBULIN SER CALC-MCNC: 2.3 G/DL (ref 1.5–4.5)
GLUCOSE SERPL-MCNC: 120 MG/DL (ref 65–99)
HBA1C MFR BLD: 7.4 % (ref 4.8–5.6)
HDLC SERPL-MCNC: 48 MG/DL
IMP & REVIEW OF LAB RESULTS: NORMAL
LDLC SERPL CALC-MCNC: 65 MG/DL (ref 0–99)
POTASSIUM SERPL-SCNC: 4.3 MMOL/L (ref 3.5–5.2)
PROT SERPL-MCNC: 6.8 G/DL (ref 6–8.5)
SODIUM SERPL-SCNC: 138 MMOL/L (ref 134–144)
T4 FREE SERPL-MCNC: 1.23 NG/DL (ref 0.82–1.77)
TRIGL SERPL-MCNC: 81 MG/DL (ref 0–149)
TSH SERPL DL<=0.005 MIU/L-ACNC: 1.41 UIU/ML (ref 0.45–4.5)
VLDLC SERPL CALC-MCNC: 16 MG/DL (ref 5–40)

## 2022-03-31 ENCOUNTER — OFFICE VISIT (OUTPATIENT)
Dept: ENDOCRINOLOGY | Age: 67
End: 2022-03-31

## 2022-03-31 VITALS
DIASTOLIC BLOOD PRESSURE: 86 MMHG | HEIGHT: 72 IN | BODY MASS INDEX: 29.93 KG/M2 | SYSTOLIC BLOOD PRESSURE: 132 MMHG | WEIGHT: 221 LBS | HEART RATE: 65 BPM | OXYGEN SATURATION: 98 %

## 2022-03-31 DIAGNOSIS — E11.8 TYPE 2 DIABETES MELLITUS WITH COMPLICATION, WITHOUT LONG-TERM CURRENT USE OF INSULIN: Primary | ICD-10-CM

## 2022-03-31 DIAGNOSIS — E78.5 HYPERLIPIDEMIA, UNSPECIFIED HYPERLIPIDEMIA TYPE: ICD-10-CM

## 2022-03-31 DIAGNOSIS — I10 ESSENTIAL HYPERTENSION: ICD-10-CM

## 2022-03-31 DIAGNOSIS — N18.32 STAGE 3B CHRONIC KIDNEY DISEASE: ICD-10-CM

## 2022-03-31 PROCEDURE — 99214 OFFICE O/P EST MOD 30 MIN: CPT | Performed by: INTERNAL MEDICINE

## 2022-03-31 NOTE — PROGRESS NOTES
Subjective   Jamal Lemus is a 66 y.o. male.     History of Present Illness       He has type 2 diabetes mellitus.  He is on glimepiride 1 mg/day and Januvia 50 mg/day.  He does not check his blood sugars at home.  He has gained 11 pounds since August 2021.  He admits to dietary noncompliance.      His last eye examination was 2/20 and there is no retinopathy. He denies any blurred vision. He denies any paresthesias. He has no microalbuminuria on urine sample taken in August 2021      He has hypertension and renal insufficiency. He is on Coreg, chlorthalidone, eplerenone, and fosinopril. He follows with Dr. Johnson last saw him in 3/21. He denies any chest pain, shortness of breath, or pedal edema.       He has hyperlipidemia and has been on Lipitor 10 mg/day. He denies any myalgia.  Lipid panel done in March 2022 are as follows: Cholesterol 129.  HDL 48.  LDL 65.  Triglycerides 81.      He is retired. He is due for colonoscopy and prostate exam. He does not have a primary care physician yet.  He denies urinary or bowel symptoms.  He has no family history of colon or prostate cancer.  He does not want to have Cologuard.     He had 3 Moderna Covid vaccines without major side effects.         The following portions of the patient's history were reviewed and updated as appropriate: allergies, current medications, past family history, past medical history, past social history, past surgical history and problem list.    Review of Systems   Constitutional: Negative.    HENT: Negative.    Eyes: Negative for visual disturbance.   Respiratory: Negative for shortness of breath and wheezing.    Cardiovascular: Negative for chest pain and palpitations.   Gastrointestinal: Negative.    Endocrine: Negative for cold intolerance and heat intolerance.   Genitourinary: Negative.    Musculoskeletal: Negative for myalgias.   Neurological: Negative for numbness.     Objective      Vitals:    03/31/22 0759   BP: 132/86   Pulse: 65   SpO2:  "98%   Weight: 100 kg (221 lb)   Height: 182.9 cm (72.01\")     Physical Exam  Constitutional:       General: He is not in acute distress.     Appearance: Normal appearance. He is not ill-appearing, toxic-appearing or diaphoretic.   Eyes:      General: No scleral icterus.        Right eye: No discharge.         Left eye: No discharge.      Extraocular Movements: Extraocular movements intact.      Conjunctiva/sclera: Conjunctivae normal.   Neck:      Vascular: No carotid bruit.   Cardiovascular:      Rate and Rhythm: Normal rate and regular rhythm.      Heart sounds: Normal heart sounds. No murmur heard.    No friction rub. No gallop.   Pulmonary:      Effort: No respiratory distress.      Breath sounds: Normal breath sounds. No stridor. No wheezing, rhonchi or rales.   Chest:      Chest wall: No tenderness.   Abdominal:      General: Bowel sounds are normal. There is no distension.      Palpations: Abdomen is soft. There is no mass.      Tenderness: There is no right CVA tenderness or left CVA tenderness.   Musculoskeletal:         General: Normal range of motion.      Cervical back: Normal range of motion. No rigidity or tenderness.   Lymphadenopathy:      Cervical: No cervical adenopathy.   Skin:     General: Skin is warm and dry.   Neurological:      General: No focal deficit present.      Mental Status: He is alert and oriented to person, place, and time.      Comments: Intact light touch on lower ext.   Psychiatric:         Mood and Affect: Mood normal.         Behavior: Behavior normal.       Lab on 03/17/2022   Component Date Value Ref Range Status   • Hemoglobin A1C 03/17/2022 7.4 (A) 4.8 - 5.6 % Final    Comment:          Prediabetes: 5.7 - 6.4           Diabetes: >6.4           Glycemic control for adults with diabetes: <7.0     • Glucose 03/17/2022 120 (A) 65 - 99 mg/dL Final   • BUN 03/17/2022 31 (A) 8 - 27 mg/dL Final   • Creatinine 03/17/2022 1.70 (A) 0.76 - 1.27 mg/dL Final   • EGFR Result 03/17/2022 44 " (A) >59 mL/min/1.73 Final   • BUN/Creatinine Ratio 03/17/2022 18  10 - 24 Final   • Sodium 03/17/2022 138  134 - 144 mmol/L Final   • Potassium 03/17/2022 4.3  3.5 - 5.2 mmol/L Final   • Chloride 03/17/2022 101  96 - 106 mmol/L Final   • Total CO2 03/17/2022 22  20 - 29 mmol/L Final   • Calcium 03/17/2022 9.8  8.6 - 10.2 mg/dL Final   • Total Protein 03/17/2022 6.8  6.0 - 8.5 g/dL Final   • Albumin 03/17/2022 4.5  3.8 - 4.8 g/dL Final   • Globulin 03/17/2022 2.3  1.5 - 4.5 g/dL Final   • A/G Ratio 03/17/2022 2.0  1.2 - 2.2 Final   • Total Bilirubin 03/17/2022 0.5  0.0 - 1.2 mg/dL Final   • Alkaline Phosphatase 03/17/2022 103  44 - 121 IU/L Final   • AST (SGOT) 03/17/2022 22  0 - 40 IU/L Final   • ALT (SGPT) 03/17/2022 35  0 - 44 IU/L Final   • Total Cholesterol 03/17/2022 129  100 - 199 mg/dL Final   • Triglycerides 03/17/2022 81  0 - 149 mg/dL Final   • HDL Cholesterol 03/17/2022 48  >39 mg/dL Final   • VLDL Cholesterol Sj 03/17/2022 16  5 - 40 mg/dL Final   • LDL Chol Calc ([facility]) 03/17/2022 65  0 - 99 mg/dL Final   • TSH 03/17/2022 1.410  0.450 - 4.500 uIU/mL Final   • Free T4 03/17/2022 1.23  0.82 - 1.77 ng/dL Final   • Interpretation 03/17/2022 Note   Final    Supplemental report is available.     Assessment/Plan   Diagnoses and all orders for this visit:    1. Type 2 diabetes mellitus with complication, without long-term current use of insulin (HCC) (Primary)    2. Essential hypertension    3. Hyperlipidemia, unspecified hyperlipidemia type    4. Stage 3b chronic kidney disease (HCC)  -     Renal Function Panel  -     Urinalysis With Microscopic If Indicated (No Culture) - Urine, Clean Catch  -     Vitamin D 25 Hydroxy  -     Protein / Creatinine Ratio, Urine - Urine, Clean Catch  -     Cystatin C  -     PSA Diagnostic      Continue Januvia 50 mg a day, and glimepiride 1 mg/day.  Continue Lipitor 10 mg/day.  Continue no concentrated sweet low-fat diet.  Try to lose 5% of current weight.  Continue Coreg,  chlorthalidone, eplerenone, and fosinopril.  Will defer blood pressure control to Dr. Russ.  Advised to get a yearly eye exam.  Advised to get a PCP and colon cancer screening.    Copy my note sent to Dr. Russ.    RTC 4 mos.

## 2022-05-31 ENCOUNTER — TELEPHONE (OUTPATIENT)
Dept: ENDOCRINOLOGY | Age: 67
End: 2022-05-31

## 2022-07-19 RX ORDER — SITAGLIPTIN 50 MG/1
TABLET, FILM COATED ORAL
Qty: 90 TABLET | Refills: 1 | Status: SHIPPED | OUTPATIENT
Start: 2022-07-19 | End: 2023-01-19

## 2022-07-19 RX ORDER — GLIMEPIRIDE 1 MG/1
TABLET ORAL
Qty: 90 TABLET | Refills: 3 | Status: SHIPPED | OUTPATIENT
Start: 2022-07-19 | End: 2022-10-15

## 2022-07-19 RX ORDER — ATORVASTATIN CALCIUM 10 MG/1
TABLET, FILM COATED ORAL
Qty: 90 TABLET | Refills: 3 | Status: SHIPPED | OUTPATIENT
Start: 2022-07-19

## 2022-07-19 NOTE — TELEPHONE ENCOUNTER
Rx Refill Note  Requested Prescriptions     Pending Prescriptions Disp Refills    glimepiride (AMARYL) 1 MG tablet [Pharmacy Med Name: GLIMEPIRIDE  TAB 1MG] 90 tablet 1     Sig: TAKE 1 TABLET EVERY MORNING    atorvastatin (LIPITOR) 10 MG tablet [Pharmacy Med Name: ATORVASTATIN TAB 10MG] 90 tablet 1     Sig: TAKE 1 TABLET DAILY    Januvia 50 MG tablet [Pharmacy Med Name: JANUVIA TAB 50MG] 90 tablet 1     Sig: TAKE 1 TABLET DAILY (THIS  REPLACES THE TRADJENTA)      Last office visit with prescribing clinician: 3/31/22     Next office visit with prescribing clinician: 10/7/22           HIRA LÓPEZ MA  07/19/22, 08:57 EDT

## 2022-09-27 LAB
25(OH)D3+25(OH)D2 SERPL-MCNC: 47.7 NG/ML (ref 30–100)
ALBUMIN SERPL-MCNC: 4.1 G/DL (ref 3.5–5.2)
APPEARANCE UR: CLEAR
BILIRUB UR QL STRIP: NEGATIVE
BUN SERPL-MCNC: 36 MG/DL (ref 8–23)
BUN/CREAT SERPL: 18.8 (ref 7–25)
CALCIUM SERPL-MCNC: 9.2 MG/DL (ref 8.6–10.5)
CHLORIDE SERPL-SCNC: 103 MMOL/L (ref 98–107)
CO2 SERPL-SCNC: 23 MMOL/L (ref 22–29)
COLOR UR: YELLOW
CREAT SERPL-MCNC: 1.91 MG/DL (ref 0.76–1.27)
CYSTATIN C SERPL-MCNC: 1.89 MG/L (ref 0.72–1.16)
EGFRCR SERPLBLD CKD-EPI 2021: 37.9 ML/MIN/1.73
GLUCOSE SERPL-MCNC: 204 MG/DL (ref 65–99)
GLUCOSE UR QL STRIP: ABNORMAL
HGB UR QL STRIP: NEGATIVE
KETONES UR QL STRIP: NEGATIVE
LEUKOCYTE ESTERASE UR QL STRIP: NEGATIVE
NITRITE UR QL STRIP: NEGATIVE
PH UR STRIP: 5.5 [PH] (ref 5–8)
PHOSPHATE SERPL-MCNC: 2.1 MG/DL (ref 2.5–4.5)
POTASSIUM SERPL-SCNC: 4 MMOL/L (ref 3.5–5.2)
PROT UR QL STRIP: NEGATIVE
PSA SERPL-MCNC: 9.11 NG/ML (ref 0–4)
SODIUM SERPL-SCNC: 138 MMOL/L (ref 136–145)
SP GR UR STRIP: 1.02 (ref 1–1.03)
UROBILINOGEN UR STRIP-MCNC: ABNORMAL MG/DL

## 2022-10-06 NOTE — PROGRESS NOTES
Subjective   Jamal Lemus is a 67 y.o. male.     History of Present Illness  F/u for dm 1, hypertension, hyperlipidemia/ pt doesn't test bs /last dm eye exam jan 2020/ last dm foot exam 3/31/22 with dr Hayes         He has type 2 diabetes mellitus.  He is on glimepiride 1 mg/day and Januvia 50 mg/day.  He does not check his blood sugars at home.  He has intentionally weight lost 16 pounds since March 2022.  His last meal was at 6:30 AM.      His last eye examination was 2/20 and there is no retinopathy. He denies any blurred vision. He denies any paresthesias. He has no microalbuminuria on urine sample taken in August 2021      He has hypertension and renal insufficiency. He is on Coreg, chlorthalidone, eplerenone, and fosinopril. He follows with Dr. Russ. He denies any chest pain, shortness of breath, or pedal edema.       He has hyperlipidemia and has been on Lipitor 10 mg/day. He denies any myalgia.        He is retired. He is due for colonoscopy and prostate exam. He does not have a primary care physician yet.  He denies urinary or bowel symptoms.  He has no family history of colon or prostate cancer.  He does not want to have Cologuard.    He has elevated PSA in September 2022 at 9.11 ng/mL.  He has no family history of prostate cancer.    He had 3 Moderna Covid vaccines without major side effects.      The following portions of the patient's history were reviewed and updated as appropriate: allergies, current medications, past family history, past medical history, past social history, past surgical history and problem list.    Review of Systems   Eyes: Negative for visual disturbance.   Respiratory: Negative for shortness of breath.    Cardiovascular: Negative for chest pain and palpitations.   Gastrointestinal: Negative.    Genitourinary: Negative.    Musculoskeletal: Negative for myalgias.   Neurological: Negative for numbness.     Vitals:    10/07/22 0945   BP: 138/90   Pulse: 82   Temp: 97.1 °F (36.2  "°C)   TempSrc: Temporal   SpO2: 98%   Weight: 93.3 kg (205 lb 9.6 oz)   Height: 182.9 cm (72.01\")      Objective   Physical Exam  Constitutional:       General: He is not in acute distress.     Appearance: Normal appearance. He is not ill-appearing, toxic-appearing or diaphoretic.   Eyes:      General: No scleral icterus.        Right eye: No discharge.         Left eye: No discharge.      Extraocular Movements: Extraocular movements intact.      Conjunctiva/sclera: Conjunctivae normal.   Neck:      Vascular: No carotid bruit.   Cardiovascular:      Rate and Rhythm: Normal rate and regular rhythm.      Pulses: Normal pulses.      Heart sounds: Normal heart sounds. No murmur heard.    No friction rub. No gallop.   Pulmonary:      Effort: Pulmonary effort is normal. No respiratory distress.      Breath sounds: Normal breath sounds. No stridor. No rales.   Chest:      Chest wall: No tenderness.   Abdominal:      General: Bowel sounds are normal.      Palpations: Abdomen is soft. There is no mass.      Tenderness: There is no right CVA tenderness or left CVA tenderness.   Musculoskeletal:         General: No tenderness. Normal range of motion.      Cervical back: Normal range of motion. No rigidity or tenderness.      Right lower leg: No edema.      Left lower leg: No edema.   Lymphadenopathy:      Cervical: No cervical adenopathy.   Neurological:      General: No focal deficit present.      Mental Status: He is alert and oriented to person, place, and time.      Comments: Intact light touch on lower ext.       Office Visit on 03/31/2022   Component Date Value Ref Range Status   • Glucose 09/23/2022 204 (A) 65 - 99 mg/dL Final   • BUN 09/23/2022 36 (A) 8 - 23 mg/dL Final   • Creatinine 09/23/2022 1.91 (A) 0.76 - 1.27 mg/dL Final   • EGFR Result 09/23/2022 37.9 (A) >60.0 mL/min/1.73 Final    Comment: National Kidney Foundation and American Society of  Nephrology (ASN) Task Force recommended calculation based  on the " Chronic Kidney Disease Epidemiology Collaboration  (CKD-EPI) equation refit without adjustment for race.  GFR Normal >60  Chronic Kidney Disease <60  Kidney Failure <15     • BUN/Creatinine Ratio 09/23/2022 18.8  7.0 - 25.0 Final   • Sodium 09/23/2022 138  136 - 145 mmol/L Final   • Potassium 09/23/2022 4.0  3.5 - 5.2 mmol/L Final   • Chloride 09/23/2022 103  98 - 107 mmol/L Final   • Total CO2 09/23/2022 23.0  22.0 - 29.0 mmol/L Final   • Calcium 09/23/2022 9.2  8.6 - 10.5 mg/dL Final   • Phosphorus 09/23/2022 2.1 (A) 2.5 - 4.5 mg/dL Final   • Albumin 09/23/2022 4.10  3.50 - 5.20 g/dL Final   • Specific Gravity, UA 09/23/2022 1.019  1.005 - 1.030 Final   • pH, UA 09/23/2022 5.5  5.0 - 8.0 Final   • Color, UA 09/23/2022 Yellow   Final    Comment: Urine microscopic not indicated.  REFERENCE RANGE: Yellow, Straw     • Appearance, UA 09/23/2022 Clear  Clear Final   • Leukocytes, UA 09/23/2022 Negative  Negative Final   • Protein 09/23/2022 Negative  Negative Final   • Glucose, UA 09/23/2022 See below: (A) Negative Final    >=1000 mg/dL (3+)   • Ketones 09/23/2022 Negative  Negative Final   • Blood, UA 09/23/2022 Negative  Negative Final   • Bilirubin, UA 09/23/2022 Negative  Negative Final   • Urobilinogen, UA 09/23/2022 Comment   Final    Comment: 0.2 E.U./dL  REFERENCE RANGE: 0.2 - 1.0 E.U./dL     • Nitrite, UA 09/23/2022 Negative  Negative Final   • 25 Hydroxy, Vitamin D 09/23/2022 47.7  30.0 - 100.0 ng/ml Final    Comment: Results may be falsely increased if patient taking Biotin.  Reference Range for Total Vitamin D 25(OH)  Deficiency <20.0 ng/mL  Insufficiency 21-29 ng/mL  Sufficiency  ng/mL  Toxicity >100 ng/ml     • Cystatin C 09/23/2022 1.89 (A) 0.72 - 1.16 mg/L Final   • PSA 09/23/2022 9.110 (A) 0.000 - 4.000 ng/mL Final    Results may be falsely decreased if patient taking Biotin.     Assessment & Plan   Diagnoses and all orders for this visit:    1. Type 2 diabetes mellitus with complication, without  long-term current use of insulin (HCC) (Primary)  -     Comprehensive Metabolic Panel  -     Hemoglobin A1c    2. Stage 3b chronic kidney disease (HCC)  -     Comprehensive Metabolic Panel    3. Essential hypertension  -     Comprehensive Metabolic Panel  -     Lipid Panel  -     TSH  -     T4, Free    4. Hyperlipidemia, unspecified hyperlipidemia type  -     Comprehensive Metabolic Panel  -     Lipid Panel  -     TSH  -     T4, Free    5. Elevated PSA  -     Ambulatory Referral to Urology  -     Comprehensive Metabolic Panel      Continue glimepiride and Januvia.  Continue Coreg, chlorthalidone, eplerenone, and fosinopril per nephrologist.  Continue atorvastatin 10 mg/day.  Referral to Dr. Humberto Kimble for evaluation of elevated PSA.  Flu vaccine this fall.    Copy of my note sent to Dr. Russ and Dr. Humberto Kimble.    RTC 4 mos.

## 2022-10-07 ENCOUNTER — OFFICE VISIT (OUTPATIENT)
Dept: ENDOCRINOLOGY | Age: 67
End: 2022-10-07

## 2022-10-07 VITALS
TEMPERATURE: 97.1 F | OXYGEN SATURATION: 98 % | HEART RATE: 82 BPM | HEIGHT: 72 IN | WEIGHT: 205.6 LBS | BODY MASS INDEX: 27.85 KG/M2 | DIASTOLIC BLOOD PRESSURE: 90 MMHG | SYSTOLIC BLOOD PRESSURE: 138 MMHG

## 2022-10-07 DIAGNOSIS — R97.20 ELEVATED PSA: ICD-10-CM

## 2022-10-07 DIAGNOSIS — E11.8 TYPE 2 DIABETES MELLITUS WITH COMPLICATION, WITHOUT LONG-TERM CURRENT USE OF INSULIN: Primary | ICD-10-CM

## 2022-10-07 DIAGNOSIS — I10 ESSENTIAL HYPERTENSION: ICD-10-CM

## 2022-10-07 DIAGNOSIS — E78.5 HYPERLIPIDEMIA, UNSPECIFIED HYPERLIPIDEMIA TYPE: ICD-10-CM

## 2022-10-07 DIAGNOSIS — N18.32 STAGE 3B CHRONIC KIDNEY DISEASE: ICD-10-CM

## 2022-10-07 PROCEDURE — 99214 OFFICE O/P EST MOD 30 MIN: CPT | Performed by: INTERNAL MEDICINE

## 2022-10-07 RX ORDER — BROMPHENIRAMINE MALEATE, PSEUDOEPHEDRINE HYDROCHLORIDE, AND DEXTROMETHORPHAN HYDROBROMIDE 2; 30; 10 MG/5ML; MG/5ML; MG/5ML
SYRUP ORAL
COMMUNITY
Start: 2022-10-01 | End: 2023-03-29

## 2022-10-07 RX ORDER — FLUTICASONE PROPIONATE 50 MCG
2 SPRAY, SUSPENSION (ML) NASAL DAILY
COMMUNITY
Start: 2022-10-01 | End: 2023-03-29

## 2022-10-08 LAB
ALBUMIN SERPL-MCNC: 4.8 G/DL (ref 3.5–5.2)
ALBUMIN/GLOB SERPL: 1.9 G/DL
ALP SERPL-CCNC: 113 U/L (ref 39–117)
ALT SERPL-CCNC: 41 U/L (ref 1–41)
AST SERPL-CCNC: 23 U/L (ref 1–40)
BILIRUB SERPL-MCNC: 0.8 MG/DL (ref 0–1.2)
BUN SERPL-MCNC: 33 MG/DL (ref 8–23)
BUN/CREAT SERPL: 18.6 (ref 7–25)
CALCIUM SERPL-MCNC: 10.7 MG/DL (ref 8.6–10.5)
CHLORIDE SERPL-SCNC: 102 MMOL/L (ref 98–107)
CHOLEST SERPL-MCNC: 119 MG/DL (ref 0–200)
CO2 SERPL-SCNC: 27 MMOL/L (ref 22–29)
CREAT SERPL-MCNC: 1.77 MG/DL (ref 0.76–1.27)
EGFRCR SERPLBLD CKD-EPI 2021: 41.6 ML/MIN/1.73
GLOBULIN SER CALC-MCNC: 2.5 GM/DL
GLUCOSE SERPL-MCNC: 144 MG/DL (ref 65–99)
HBA1C MFR BLD: 7.6 % (ref 4.8–5.6)
HDLC SERPL-MCNC: 49 MG/DL (ref 40–60)
IMP & REVIEW OF LAB RESULTS: NORMAL
LDLC SERPL CALC-MCNC: 53 MG/DL (ref 0–100)
POTASSIUM SERPL-SCNC: 4.5 MMOL/L (ref 3.5–5.2)
PROT SERPL-MCNC: 7.3 G/DL (ref 6–8.5)
SODIUM SERPL-SCNC: 138 MMOL/L (ref 136–145)
T4 FREE SERPL-MCNC: 1.51 NG/DL (ref 0.93–1.7)
TRIGL SERPL-MCNC: 89 MG/DL (ref 0–150)
TSH SERPL DL<=0.005 MIU/L-ACNC: 0.87 UIU/ML (ref 0.27–4.2)
VLDLC SERPL CALC-MCNC: 17 MG/DL (ref 5–40)

## 2022-10-15 DIAGNOSIS — N18.32 STAGE 3B CHRONIC KIDNEY DISEASE: ICD-10-CM

## 2022-10-15 DIAGNOSIS — E78.5 HYPERLIPIDEMIA, UNSPECIFIED HYPERLIPIDEMIA TYPE: ICD-10-CM

## 2022-10-15 DIAGNOSIS — E11.8 TYPE 2 DIABETES MELLITUS WITH COMPLICATION, WITHOUT LONG-TERM CURRENT USE OF INSULIN: Primary | ICD-10-CM

## 2022-10-15 RX ORDER — GLIMEPIRIDE 2 MG/1
2 TABLET ORAL EVERY MORNING
Qty: 90 TABLET | Refills: 2 | Status: SHIPPED | OUTPATIENT
Start: 2022-10-15

## 2022-10-15 NOTE — PROGRESS NOTES
Hemoglobin A1c slightly higher at 7.6%.  Increase glimepiride to 2 mg every morning.  Prescription sent to pharmacy.  LDL 53.  HDL 49.  Triglycerides 89.  Continue Lipitor 10 mg/day.  Normal thyroid function tests.    Copy of labs sent to Dr. Russ and Dr. Humberto Kimble.  Please notify patient results and instructions.

## 2023-01-19 RX ORDER — SITAGLIPTIN 50 MG/1
TABLET, FILM COATED ORAL
Qty: 90 TABLET | Refills: 1 | Status: SHIPPED | OUTPATIENT
Start: 2023-01-19

## 2023-03-29 ENCOUNTER — OFFICE VISIT (OUTPATIENT)
Dept: ENDOCRINOLOGY | Age: 68
End: 2023-03-29
Payer: COMMERCIAL

## 2023-03-29 VITALS
TEMPERATURE: 96.8 F | BODY MASS INDEX: 29.5 KG/M2 | HEIGHT: 72 IN | OXYGEN SATURATION: 98 % | SYSTOLIC BLOOD PRESSURE: 140 MMHG | HEART RATE: 69 BPM | DIASTOLIC BLOOD PRESSURE: 82 MMHG | WEIGHT: 217.8 LBS

## 2023-03-29 DIAGNOSIS — I10 ESSENTIAL HYPERTENSION: ICD-10-CM

## 2023-03-29 DIAGNOSIS — E11.8 TYPE 2 DIABETES MELLITUS WITH COMPLICATION, WITHOUT LONG-TERM CURRENT USE OF INSULIN: Primary | ICD-10-CM

## 2023-03-29 DIAGNOSIS — E78.5 HYPERLIPIDEMIA, UNSPECIFIED HYPERLIPIDEMIA TYPE: ICD-10-CM

## 2023-03-29 PROCEDURE — 99214 OFFICE O/P EST MOD 30 MIN: CPT | Performed by: INTERNAL MEDICINE

## 2023-03-29 NOTE — PROGRESS NOTES
"Diana Lemus is a 67 y.o. male.     History of Present Illness     He has type 2 diabetes mellitus.  He is on glimepiride 2 mg/day and Januvia 50 mg/day.  He does not check his blood sugars at home.  He has gained 12 pounds since 10/22 .  His last meal was at 6:30 AM.      His last eye examination was 11/22 and there is no retinopathy. He denies any blurred vision. He denies any paresthesias. He has no microalbuminuria on urine sample taken in August 2021      He has hypertension and renal insufficiency. He is on Coreg, chlorthalidone, eplerenone, and fosinopril. He follows with Dr. Russ. He denies any chest pain, shortness of breath, or pedal edema.       He has hyperlipidemia and has been on Lipitor 10 mg/day. He denies any myalgia.        He is retired. He is due for colonoscopy. He does not have a primary care physician yet.  He denies urinary or bowel symptoms.  He has no family history of colon or prostate cancer.  He does not want to have Cologuard.     He has elevated PSA in September 2022 at 9.11 ng/mL.  He has no family history of prostate cancer.  He had a negative prostate biopsy done by Dr. Kimble in 12/22.  He denies urinary symptoms.    The following portions of the patient's history were reviewed and updated as appropriate: allergies, current medications, past family history, past medical history, past social history, past surgical history and problem list.    Review of Systems   Eyes: Negative for visual disturbance.   Respiratory: Negative for shortness of breath.    Cardiovascular: Negative for chest pain and palpitations.   Gastrointestinal: Negative.    Genitourinary: Negative.    Musculoskeletal: Negative.    Neurological: Negative for numbness.   Hematological: Negative for adenopathy. Does not bruise/bleed easily.     Vitals:    03/29/23 0919   BP: 140/82   Pulse: 69   Temp: 96.8 °F (36 °C)   SpO2: 98%   Weight: 98.8 kg (217 lb 12.8 oz)   Height: 182.9 cm (72.01\")    "   Objective   Physical Exam  Constitutional:       General: He is not in acute distress.     Appearance: Normal appearance. He is not ill-appearing, toxic-appearing or diaphoretic.   Eyes:      General: No scleral icterus.        Right eye: No discharge.         Left eye: No discharge.      Extraocular Movements: Extraocular movements intact.      Conjunctiva/sclera: Conjunctivae normal.   Neck:      Vascular: No carotid bruit.   Cardiovascular:      Rate and Rhythm: Regular rhythm.      Heart sounds: Normal heart sounds. No murmur heard.    No friction rub.   Pulmonary:      Breath sounds: Normal breath sounds. No rales.   Chest:      Chest wall: No tenderness.   Abdominal:      General: Bowel sounds are normal.      Palpations: Abdomen is soft.      Tenderness: There is no right CVA tenderness or left CVA tenderness.   Musculoskeletal:         General: Normal range of motion.      Cervical back: No rigidity or tenderness.      Right lower leg: No edema.      Left lower leg: No edema.   Lymphadenopathy:      Cervical: No cervical adenopathy.   Neurological:      General: No focal deficit present.      Mental Status: He is alert and oriented to person, place, and time.   Psychiatric:         Mood and Affect: Mood normal.         Behavior: Behavior normal.       Office Visit on 10/07/2022   Component Date Value Ref Range Status   • Glucose 10/07/2022 144 (H)  65 - 99 mg/dL Final   • BUN 10/07/2022 33 (H)  8 - 23 mg/dL Final   • Creatinine 10/07/2022 1.77 (H)  0.76 - 1.27 mg/dL Final   • EGFR Result 10/07/2022 41.6 (L)  >60.0 mL/min/1.73 Final    Comment: National Kidney Foundation and American Society of  Nephrology (ASN) Task Force recommended calculation based  on the Chronic Kidney Disease Epidemiology Collaboration  (CKD-EPI) equation refit without adjustment for race.  GFR Normal >60  Chronic Kidney Disease <60  Kidney Failure <15     • BUN/Creatinine Ratio 10/07/2022 18.6  7.0 - 25.0 Final   • Sodium 10/07/2022  138  136 - 145 mmol/L Final   • Potassium 10/07/2022 4.5  3.5 - 5.2 mmol/L Final   • Chloride 10/07/2022 102  98 - 107 mmol/L Final   • Total CO2 10/07/2022 27.0  22.0 - 29.0 mmol/L Final   • Calcium 10/07/2022 10.7 (H)  8.6 - 10.5 mg/dL Final   • Total Protein 10/07/2022 7.3  6.0 - 8.5 g/dL Final   • Albumin 10/07/2022 4.80  3.50 - 5.20 g/dL Final   • Globulin 10/07/2022 2.5  gm/dL Final   • A/G Ratio 10/07/2022 1.9  g/dL Final   • Total Bilirubin 10/07/2022 0.8  0.0 - 1.2 mg/dL Final   • Alkaline Phosphatase 10/07/2022 113  39 - 117 U/L Final   • AST (SGOT) 10/07/2022 23  1 - 40 U/L Final   • ALT (SGPT) 10/07/2022 41  1 - 41 U/L Final   • Hemoglobin A1C 10/07/2022 7.60 (H)  4.80 - 5.60 % Final    Comment: Hemoglobin A1C Ranges:  Increased Risk for Diabetes  5.7% to 6.4%  Diabetes                     >= 6.5%  Diabetic Goal                < 7.0%     • Total Cholesterol 10/07/2022 119  0 - 200 mg/dL Final    Comment: Cholesterol Reference Ranges  (U.S. Department of Health and Human Services ATP III  Classifications)  Desirable          <200 mg/dL  Borderline High    200-239 mg/dL  High Risk          >240 mg/dL  Triglyceride Reference Ranges  (U.S. Department of Health and Human Services ATP III  Classifications)  Normal           <150 mg/dL  Borderline High  150-199 mg/dL  High             200-499 mg/dL  Very High        >500 mg/dL  HDL Reference Ranges  (U.S. Department of Health and Human Services ATP III  Classifications)  Low     <40 mg/dl (major risk factor for CHD)  High    >60 mg/dl ('negative' risk factor for CHD)  LDL Reference Ranges  (U.S. Department of Health and Human Services ATP III  Classifications)  Optimal          <100 mg/dL  Near Optimal     100-129 mg/dL  Borderline High  130-159 mg/dL  High             160-189 mg/dL  Very High        >189 mg/dL     • Triglycerides 10/07/2022 89  0 - 150 mg/dL Final   • HDL Cholesterol 10/07/2022 49  40 - 60 mg/dL Final   • VLDL Cholesterol Sj 10/07/2022 17  5  - 40 mg/dL Final   • LDL Chol Calc ([facility]) 10/07/2022 53  0 - 100 mg/dL Final   • TSH 10/07/2022 0.868  0.270 - 4.200 uIU/mL Final   • Free T4 10/07/2022 1.51  0.93 - 1.70 ng/dL Final    Results may be falsely increased if patient taking Biotin.   • Interpretation 10/07/2022 Note   Final    Supplemental report is available.     Assessment & Plan   Diagnoses and all orders for this visit:    1. Type 2 diabetes mellitus with complication, without long-term current use of insulin (HCC) (Primary)  -     Comprehensive Metabolic Panel  -     Hemoglobin A1c  -     Microalbumin / Creatinine Urine Ratio - Urine, Clean Catch    2. Essential hypertension  -     Comprehensive Metabolic Panel    3. Hyperlipidemia, unspecified hyperlipidemia type  -     Lipid Panel  -     TSH  -     T4, Free      Continue glimepiride 2 mg/day and Januvia 50 mg/day.  Continue Coreg, chlorthalidone, eplerenone, and fosinopril.  Continue Lipitor 10 mg/day.    RTC 6 mos.    Copy of my note sent to Dr. Russ.

## 2023-10-06 ENCOUNTER — OFFICE VISIT (OUTPATIENT)
Dept: ENDOCRINOLOGY | Age: 68
End: 2023-10-06
Payer: COMMERCIAL

## 2023-10-06 VITALS
BODY MASS INDEX: 28.99 KG/M2 | TEMPERATURE: 96.3 F | SYSTOLIC BLOOD PRESSURE: 142 MMHG | OXYGEN SATURATION: 99 % | HEIGHT: 72 IN | WEIGHT: 214 LBS | HEART RATE: 62 BPM | DIASTOLIC BLOOD PRESSURE: 82 MMHG

## 2023-10-06 DIAGNOSIS — E78.5 HYPERLIPIDEMIA, UNSPECIFIED HYPERLIPIDEMIA TYPE: ICD-10-CM

## 2023-10-06 DIAGNOSIS — N18.32 STAGE 3B CHRONIC KIDNEY DISEASE: ICD-10-CM

## 2023-10-06 DIAGNOSIS — I10 ESSENTIAL HYPERTENSION: ICD-10-CM

## 2023-10-06 DIAGNOSIS — E11.8 TYPE 2 DIABETES MELLITUS WITH COMPLICATION, WITHOUT LONG-TERM CURRENT USE OF INSULIN: Primary | ICD-10-CM

## 2023-10-06 PROCEDURE — 99214 OFFICE O/P EST MOD 30 MIN: CPT | Performed by: INTERNAL MEDICINE

## 2023-10-06 NOTE — PROGRESS NOTES
Subjective   Jamal Lemus is a 68 y.o. male.     History of Present Illness     He has type 2 diabetes mellitus.  He is on glimepiride 2 mg/day and Januvia 50 mg/day.  He does not check his blood sugars at home.  He has lost 3 lbs since 3/23.  He is not on a diabetic diet.        His last eye examination was 11/22 and there is no retinopathy. He denies any blurred vision. He denies any paresthesias. He has no microalbuminuria on urine sample taken in August 2021      He has hypertension and renal insufficiency. He is on Coreg, chlorthalidone, eplerenone, and fosinopril. He follows with Dr. Russ. He denies any chest pain, shortness of breath, or pedal edema.       He has hyperlipidemia and has been on Lipitor 10 mg/day. He denies any myalgia.        He is retired. He is due for colonoscopy. He does not have a primary care physician yet.  He denies urinary or bowel symptoms.  He has no family history of colon or prostate cancer.  He does not want to have Cologuard.     He has elevated PSA in September 2022 at 9.11 ng/mL.  He has no family history of prostate cancer.  He had a negative prostate biopsy done by Dr. Kimble in 12/22.  He denies urinary symptoms.  He saw Dr. Kimble in 9/23.    The following portions of the patient's history were reviewed and updated as appropriate: allergies, current medications, past family history, past medical history, past social history, past surgical history, and problem list.    Review of Systems   Eyes:  Negative for visual disturbance.   Respiratory:  Negative for shortness of breath.    Cardiovascular:  Negative for chest pain and palpitations.   Gastrointestinal: Negative.    Genitourinary:  Negative for difficulty urinating and dysuria.   Musculoskeletal:  Negative for myalgias.   Neurological:  Negative for numbness.   Vitals:    10/06/23 1050   BP: 142/82   Pulse: 62   Temp: 96.3 °F (35.7 °C)   TempSrc: Temporal   SpO2: 99%   Weight: 97.1 kg (214 lb)   Height: 182.9 cm  "(72.01\")      Objective   Physical Exam  Constitutional:       General: He is not in acute distress.     Appearance: Normal appearance. He is not ill-appearing, toxic-appearing or diaphoretic.   Eyes:      General: No scleral icterus.        Right eye: No discharge.         Left eye: No discharge.   Neck:      Vascular: No carotid bruit.   Cardiovascular:      Rate and Rhythm: Regular rhythm.      Heart sounds: Normal heart sounds. No murmur heard.    No friction rub. No gallop.   Pulmonary:      Breath sounds: Normal breath sounds. No rales.   Chest:      Chest wall: No tenderness.   Abdominal:      General: Bowel sounds are normal.      Palpations: Abdomen is soft.      Tenderness: There is no right CVA tenderness or left CVA tenderness.   Musculoskeletal:      Right lower leg: No edema.      Left lower leg: No edema.   Lymphadenopathy:      Cervical: No cervical adenopathy.   Skin:     General: Skin is warm.   Neurological:      Mental Status: He is alert and oriented to person, place, and time.      Comments: Intact light touch     Office Visit on 10/07/2022   Component Date Value Ref Range Status    Glucose 10/07/2022 144 (H)  65 - 99 mg/dL Final    BUN 10/07/2022 33 (H)  8 - 23 mg/dL Final    Creatinine 10/07/2022 1.77 (H)  0.76 - 1.27 mg/dL Final    EGFR Result 10/07/2022 41.6 (L)  >60.0 mL/min/1.73 Final    Comment: National Kidney Foundation and American Society of  Nephrology (ASN) Task Force recommended calculation based  on the Chronic Kidney Disease Epidemiology Collaboration  (CKD-EPI) equation refit without adjustment for race.  GFR Normal >60  Chronic Kidney Disease <60  Kidney Failure <15      BUN/Creatinine Ratio 10/07/2022 18.6  7.0 - 25.0 Final    Sodium 10/07/2022 138  136 - 145 mmol/L Final    Potassium 10/07/2022 4.5  3.5 - 5.2 mmol/L Final    Chloride 10/07/2022 102  98 - 107 mmol/L Final    Total CO2 10/07/2022 27.0  22.0 - 29.0 mmol/L Final    Calcium 10/07/2022 10.7 (H)  8.6 - 10.5 mg/dL " Final    Total Protein 10/07/2022 7.3  6.0 - 8.5 g/dL Final    Albumin 10/07/2022 4.80  3.50 - 5.20 g/dL Final    Globulin 10/07/2022 2.5  gm/dL Final    A/G Ratio 10/07/2022 1.9  g/dL Final    Total Bilirubin 10/07/2022 0.8  0.0 - 1.2 mg/dL Final    Alkaline Phosphatase 10/07/2022 113  39 - 117 U/L Final    AST (SGOT) 10/07/2022 23  1 - 40 U/L Final    ALT (SGPT) 10/07/2022 41  1 - 41 U/L Final    Hemoglobin A1C 10/07/2022 7.60 (H)  4.80 - 5.60 % Final    Comment: Hemoglobin A1C Ranges:  Increased Risk for Diabetes  5.7% to 6.4%  Diabetes                     >= 6.5%  Diabetic Goal                < 7.0%      Total Cholesterol 10/07/2022 119  0 - 200 mg/dL Final    Comment: Cholesterol Reference Ranges  (U.S. Department of Health and Human Services ATP III  Classifications)  Desirable          <200 mg/dL  Borderline High    200-239 mg/dL  High Risk          >240 mg/dL  Triglyceride Reference Ranges  (U.S. Department of Health and Human Services ATP III  Classifications)  Normal           <150 mg/dL  Borderline High  150-199 mg/dL  High             200-499 mg/dL  Very High        >500 mg/dL  HDL Reference Ranges  (U.S. Department of Health and Human Services ATP III  Classifications)  Low     <40 mg/dl (major risk factor for CHD)  High    >60 mg/dl ('negative' risk factor for CHD)  LDL Reference Ranges  (U.S. Department of Health and Human Services ATP III  Classifications)  Optimal          <100 mg/dL  Near Optimal     100-129 mg/dL  Borderline High  130-159 mg/dL  High             160-189 mg/dL  Very High        >189 mg/dL      Triglycerides 10/07/2022 89  0 - 150 mg/dL Final    HDL Cholesterol 10/07/2022 49  40 - 60 mg/dL Final    VLDL Cholesterol Sj 10/07/2022 17  5 - 40 mg/dL Final    LDL Chol Calc (NIH) 10/07/2022 53  0 - 100 mg/dL Final    TSH 10/07/2022 0.868  0.270 - 4.200 uIU/mL Final    Free T4 10/07/2022 1.51  0.93 - 1.70 ng/dL Final    Results may be falsely increased if patient taking Biotin.     Interpretation 10/07/2022 Note   Final    Supplemental report is available.     Assessment & Plan   Diagnoses and all orders for this visit:    1. Type 2 diabetes mellitus with complication, without long-term current use of insulin (Primary)  -     Comprehensive Metabolic Panel  -     Hemoglobin A1c  -     Microalbumin / Creatinine Urine Ratio - Urine, Clean Catch    2. Stage 3b chronic kidney disease  -     Comprehensive Metabolic Panel    3. Hyperlipidemia, unspecified hyperlipidemia type  -     Comprehensive Metabolic Panel  -     Lipid Panel    4. Essential hypertension  -     Comprehensive Metabolic Panel  -     TSH      Continue glimepiride 2 mg/day.  Consider switching from Januvia to SGLT inhibitors.  Continue Coreg, chlorthalidone, eplerenone, lisinopril.  Follow-up with Dr. Russ.  Continue Lipitor 10 mg/day.  Advised to get flu vaccine.  Advised to get a PCP.  Patient will let me know when he wants to get a colonoscopy.    Copy of my note sent to Dr. Russ.    Follow-up in 6 months.

## 2023-10-07 LAB
ALBUMIN SERPL-MCNC: 5 G/DL (ref 3.5–5.2)
ALBUMIN/CREAT UR: <3 MG/G CREAT (ref 0–29)
ALBUMIN/GLOB SERPL: 2.1 G/DL
ALP SERPL-CCNC: 88 U/L (ref 39–117)
ALT SERPL-CCNC: 31 U/L (ref 1–41)
AST SERPL-CCNC: 24 U/L (ref 1–40)
BILIRUB SERPL-MCNC: 0.6 MG/DL (ref 0–1.2)
BUN SERPL-MCNC: 36 MG/DL (ref 8–23)
BUN/CREAT SERPL: 22.1 (ref 7–25)
CALCIUM SERPL-MCNC: 10.7 MG/DL (ref 8.6–10.5)
CHLORIDE SERPL-SCNC: 103 MMOL/L (ref 98–107)
CHOLEST SERPL-MCNC: 132 MG/DL (ref 0–200)
CO2 SERPL-SCNC: 24.4 MMOL/L (ref 22–29)
CREAT SERPL-MCNC: 1.63 MG/DL (ref 0.76–1.27)
CREAT UR-MCNC: 87.7 MG/DL
EGFRCR SERPLBLD CKD-EPI 2021: 45.6 ML/MIN/1.73
GLOBULIN SER CALC-MCNC: 2.4 GM/DL
GLUCOSE SERPL-MCNC: 89 MG/DL (ref 65–99)
HBA1C MFR BLD: 7.7 % (ref 4.8–5.6)
HDLC SERPL-MCNC: 49 MG/DL (ref 40–60)
IMP & REVIEW OF LAB RESULTS: NORMAL
LDLC SERPL CALC-MCNC: 68 MG/DL (ref 0–100)
MICROALBUMIN UR-MCNC: <3 UG/ML
POTASSIUM SERPL-SCNC: 4.4 MMOL/L (ref 3.5–5.2)
PROT SERPL-MCNC: 7.4 G/DL (ref 6–8.5)
SODIUM SERPL-SCNC: 140 MMOL/L (ref 136–145)
TRIGL SERPL-MCNC: 77 MG/DL (ref 0–150)
TSH SERPL DL<=0.005 MIU/L-ACNC: 0.86 UIU/ML (ref 0.27–4.2)
VLDLC SERPL CALC-MCNC: 15 MG/DL (ref 5–40)

## 2023-10-10 DIAGNOSIS — E83.52 HYPERCALCEMIA: Primary | ICD-10-CM

## 2023-10-10 RX ORDER — FUROSEMIDE 20 MG/1
20 TABLET ORAL DAILY
Qty: 30 TABLET | Refills: 6 | Status: SHIPPED | OUTPATIENT
Start: 2023-10-10 | End: 2024-10-09

## 2023-10-11 ENCOUNTER — TELEPHONE (OUTPATIENT)
Dept: ENDOCRINOLOGY | Age: 68
End: 2023-10-11
Payer: COMMERCIAL

## 2023-10-11 ENCOUNTER — TELEPHONE (OUTPATIENT)
Dept: ENDOCRINOLOGY | Age: 68
End: 2023-10-11

## 2023-10-11 NOTE — TELEPHONE ENCOUNTER
Caller: Jamal Lemus    Relationship to patient: Self    Best call back number: [phone] (home)   [phone]    Patient is needing: SCHEDULE LABS

## 2023-10-11 NOTE — TELEPHONE ENCOUNTER
10/11 called and lm for pt to call and make a lab appt   Ok for hub to read to patient   Please schedule labs if needed or follow ups  Ok for  to read to patient   Please schedule labs if needed or follow ups     ----- Message from Miguel Ángel Hayes MD sent at 10/10/2023  9:50 PM EDT -----    Discontinue chlorthalidone and switch to Lasix 20 mg every morning.  Prescription sent to [facility] pharmacy.  Repeat CMP, phosphorus, intact PTH, ionized calcium, protein electrophoresis in 1 to 2 weeks.  Orders placed on chart.

## 2023-11-03 DIAGNOSIS — E83.52 HYPERCALCEMIA: ICD-10-CM

## 2023-11-06 LAB
ALBUMIN SERPL ELPH-MCNC: 3.6 G/DL (ref 2.9–4.4)
ALBUMIN SERPL-MCNC: 4.2 G/DL (ref 3.9–4.9)
ALBUMIN/GLOB SERPL: 1.2 {RATIO} (ref 0.7–1.7)
ALBUMIN/GLOB SERPL: 1.7 {RATIO} (ref 1.2–2.2)
ALP SERPL-CCNC: 116 IU/L (ref 44–121)
ALPHA1 GLOB SERPL ELPH-MCNC: 0.2 G/DL (ref 0–0.4)
ALPHA2 GLOB SERPL ELPH-MCNC: 0.7 G/DL (ref 0.4–1)
ALT SERPL-CCNC: 40 IU/L (ref 0–44)
AST SERPL-CCNC: 21 IU/L (ref 0–40)
B-GLOBULIN SERPL ELPH-MCNC: 1.2 G/DL (ref 0.7–1.3)
BILIRUB SERPL-MCNC: 0.5 MG/DL (ref 0–1.2)
BUN SERPL-MCNC: 26 MG/DL (ref 8–27)
BUN/CREAT SERPL: 15 (ref 10–24)
CA-I SERPL ISE-MCNC: 5.1 MG/DL (ref 4.5–5.6)
CALCIUM SERPL-MCNC: 9.8 MG/DL (ref 8.6–10.2)
CHLORIDE SERPL-SCNC: 101 MMOL/L (ref 96–106)
CO2 SERPL-SCNC: 25 MMOL/L (ref 20–29)
CREAT SERPL-MCNC: 1.68 MG/DL (ref 0.76–1.27)
EGFRCR SERPLBLD CKD-EPI 2021: 44 ML/MIN/1.73
GAMMA GLOB SERPL ELPH-MCNC: 1 G/DL (ref 0.4–1.8)
GLOBULIN SER CALC-MCNC: 2.5 G/DL (ref 1.5–4.5)
GLOBULIN SER-MCNC: 3.1 G/DL (ref 2.2–3.9)
GLUCOSE SERPL-MCNC: 201 MG/DL (ref 70–99)
IGA SERPL-MCNC: 218 MG/DL (ref 61–437)
IGG SERPL-MCNC: 1108 MG/DL (ref 603–1613)
IGM SERPL-MCNC: 68 MG/DL (ref 20–172)
INTERPRETATION SERPL IEP-IMP: NORMAL
LABORATORY COMMENT REPORT: NORMAL
M PROTEIN SERPL ELPH-MCNC: NORMAL G/DL
PHOSPHATE SERPL-MCNC: 2 MG/DL (ref 2.8–4.1)
POTASSIUM SERPL-SCNC: 4.8 MMOL/L (ref 3.5–5.2)
PROT SERPL-MCNC: 6.7 G/DL (ref 6–8.5)
PTH-INTACT SERPL-MCNC: 25 PG/ML (ref 15–65)
REPORT: NORMAL
SODIUM SERPL-SCNC: 137 MMOL/L (ref 134–144)

## 2023-11-06 NOTE — PROGRESS NOTES
Normal calcium since patient was taken off chlorthalidone and switch to Lasix 20 mg every morning.  Normal ionized calcium.  Normal serum protein electrophoresis.  Normal PTH.  Serum phosphorus low.  Please instruct patient to see Dr. Russ for further evaluation and treatment.  Copy of labs sent to Dr. Russ

## 2023-11-09 DIAGNOSIS — E78.5 HYPERLIPIDEMIA, UNSPECIFIED HYPERLIPIDEMIA TYPE: Primary | ICD-10-CM

## 2023-11-09 RX ORDER — FUROSEMIDE 20 MG/1
20 TABLET ORAL DAILY
Qty: 90 TABLET | Refills: 2 | Status: SHIPPED | OUTPATIENT
Start: 2023-11-09 | End: 2024-11-08

## 2024-01-08 RX ORDER — SITAGLIPTIN 50 MG/1
TABLET, FILM COATED ORAL
Qty: 90 TABLET | Refills: 2 | Status: SHIPPED | OUTPATIENT
Start: 2024-01-08

## 2024-01-08 NOTE — TELEPHONE ENCOUNTER
Rx Refill Note  Requested Prescriptions     Pending Prescriptions Disp Refills    Januvia 50 MG tablet [Pharmacy Med Name: JANUVIA TAB 50MG] 90 tablet 1     Sig: TAKE 1 TABLET DAILY (THIS  REPLACES THE TRADJENTA)      Last office visit with prescribing clinician: 10/6/2023   Last telemedicine visit with prescribing clinician: Visit date not found   Next office visit with prescribing clinician: 4/16/2024                         Would you like a call back once the refill request has been completed: [] Yes [] No    If the office needs to give you a call back, can they leave a voicemail: [] Yes [] No    Carine Sales MA  01/08/24, 08:27 EST

## 2024-03-25 RX ORDER — GLIMEPIRIDE 2 MG/1
TABLET ORAL
Qty: 90 TABLET | Refills: 2 | Status: SHIPPED | OUTPATIENT
Start: 2024-03-25

## 2024-03-25 NOTE — TELEPHONE ENCOUNTER
Rx Refill Note  Requested Prescriptions     Pending Prescriptions Disp Refills    glimepiride (AMARYL) 2 MG tablet [Pharmacy Med Name: GLIMEPIRIDE  TAB 2MG] 90 tablet 2     Sig: TAKE 1 TABLET EVERY MORNING      Last office visit with prescribing clinician: 10/6/2023   Last telemedicine visit with prescribing clinician: Visit date not found   Next office visit with prescribing clinician: 4/16/2024                         Would you like a call back once the refill request has been completed: [] Yes [] No    If the office needs to give you a call back, can they leave a voicemail: [] Yes [] No    Radha Sales  03/25/24, 10:16 EDT

## 2024-04-16 ENCOUNTER — OFFICE VISIT (OUTPATIENT)
Dept: ENDOCRINOLOGY | Age: 69
End: 2024-04-16
Payer: COMMERCIAL

## 2024-04-16 VITALS
DIASTOLIC BLOOD PRESSURE: 90 MMHG | OXYGEN SATURATION: 97 % | HEART RATE: 60 BPM | WEIGHT: 210.6 LBS | HEIGHT: 72 IN | SYSTOLIC BLOOD PRESSURE: 158 MMHG | BODY MASS INDEX: 28.53 KG/M2 | TEMPERATURE: 97.2 F

## 2024-04-16 DIAGNOSIS — E78.5 HYPERLIPIDEMIA, UNSPECIFIED HYPERLIPIDEMIA TYPE: ICD-10-CM

## 2024-04-16 DIAGNOSIS — I10 ESSENTIAL HYPERTENSION: ICD-10-CM

## 2024-04-16 DIAGNOSIS — E11.8 TYPE 2 DIABETES MELLITUS WITH COMPLICATION, WITHOUT LONG-TERM CURRENT USE OF INSULIN: Primary | ICD-10-CM

## 2024-04-16 LAB
ALBUMIN SERPL-MCNC: 4.4 G/DL (ref 3.5–5.2)
ALBUMIN/GLOB SERPL: 1.6 G/DL
ALP SERPL-CCNC: 98 U/L (ref 39–117)
ALT SERPL-CCNC: 44 U/L (ref 1–41)
AST SERPL-CCNC: 28 U/L (ref 1–40)
BILIRUB SERPL-MCNC: 0.9 MG/DL (ref 0–1.2)
BUN SERPL-MCNC: 22 MG/DL (ref 8–23)
BUN/CREAT SERPL: 13.3 (ref 7–25)
CALCIUM SERPL-MCNC: 9.8 MG/DL (ref 8.6–10.5)
CHLORIDE SERPL-SCNC: 102 MMOL/L (ref 98–107)
CHOLEST SERPL-MCNC: 114 MG/DL (ref 0–200)
CO2 SERPL-SCNC: 25.2 MMOL/L (ref 22–29)
CREAT SERPL-MCNC: 1.66 MG/DL (ref 0.76–1.27)
EGFRCR SERPLBLD CKD-EPI 2021: 44.6 ML/MIN/1.73
GLOBULIN SER CALC-MCNC: 2.7 GM/DL
GLUCOSE SERPL-MCNC: 139 MG/DL (ref 65–99)
HBA1C MFR BLD: 7.8 % (ref 4.8–5.6)
HDLC SERPL-MCNC: 49 MG/DL (ref 40–60)
IMP & REVIEW OF LAB RESULTS: NORMAL
LDLC SERPL CALC-MCNC: 49 MG/DL (ref 0–100)
POTASSIUM SERPL-SCNC: 4.4 MMOL/L (ref 3.5–5.2)
PROT SERPL-MCNC: 7.1 G/DL (ref 6–8.5)
SODIUM SERPL-SCNC: 138 MMOL/L (ref 136–145)
TRIGL SERPL-MCNC: 78 MG/DL (ref 0–150)
TSH SERPL DL<=0.005 MIU/L-ACNC: 0.7 UIU/ML (ref 0.27–4.2)
VLDLC SERPL CALC-MCNC: 16 MG/DL (ref 5–40)

## 2024-04-16 PROCEDURE — 99214 OFFICE O/P EST MOD 30 MIN: CPT | Performed by: INTERNAL MEDICINE

## 2024-04-16 NOTE — PROGRESS NOTES
Subjective   Jamal Lemus is a 68 y.o. male.     History of Present Illness     He has type 2 diabetes mellitus.  He is on glimepiride 2 mg/day and Januvia 50 mg/day.  He does not check his blood sugars at home.  He has lost 4 lbs since 10/23.  He is not on a diabetic diet.        His last eye examination was 2/24 and there is no retinopathy.  He has early cataracts.  He denies any blurred vision. He denies any paresthesias. He has no microalbuminuria on urine sample taken in October 2023      He has hypertension and renal insufficiency. He is on Coreg, furosemide, eplerenone, and fosinopril.  Chlorthalidone was discontinued because of hypercalcemia.  He complains of increased urinary flow since he was switch to Lasix 20 mg/day.  He follows with Dr. Russ. He denies any chest pain, shortness of breath, or pedal edema.       He has hyperlipidemia and has been on Lipitor 10 mg/day. He denies any myalgia.        He is retired. He is due for colonoscopy. He denies urinary or bowel symptoms.  He has no family history of colon or prostate cancer.  He does not want to have Cologuard.  He does not have a primary care physician.     He has elevated PSA in September 2022 at 9.11 ng/mL.  He has no family history of prostate cancer.  He had a negative prostate biopsy done by Dr. Kimble in 12/22.  He denies urinary symptoms.  He follows with Dr. Humberto Kimble.    The following portions of the patient's history were reviewed and updated as appropriate: allergies, current medications, past family history, past medical history, past social history, past surgical history, and problem list.    Review of Systems   Eyes:  Negative for visual disturbance.   Respiratory:  Negative for shortness of breath and wheezing.    Cardiovascular:  Negative for chest pain and palpitations.   Gastrointestinal: Negative.    Genitourinary: Negative.    Musculoskeletal:  Negative for myalgias.   Neurological:  Negative for numbness.  "    Vitals:    04/16/24 1056   BP: 158/90   Pulse: 60   Temp: 97.2 °F (36.2 °C)   TempSrc: Temporal   SpO2: 97%   Weight: 95.5 kg (210 lb 9.6 oz)   Height: 182.9 cm (72.01\")      Objective   Physical Exam  Constitutional:       General: He is not in acute distress.     Appearance: Normal appearance. He is obese. He is not ill-appearing or toxic-appearing.   Eyes:      General: No scleral icterus.        Right eye: No discharge.         Left eye: No discharge.   Neck:      Vascular: No carotid bruit.   Cardiovascular:      Rate and Rhythm: Normal rate and regular rhythm.      Pulses: Normal pulses.      Heart sounds: Normal heart sounds.   Pulmonary:      Breath sounds: Normal breath sounds. No rales.   Chest:      Chest wall: No tenderness.   Abdominal:      General: Bowel sounds are normal.      Palpations: Abdomen is soft.      Tenderness: There is no right CVA tenderness or left CVA tenderness.   Musculoskeletal:      Right lower leg: No edema.      Left lower leg: No edema.   Lymphadenopathy:      Cervical: No cervical adenopathy.   Neurological:      Mental Status: He is alert and oriented to person, place, and time.       Orders Only on 11/03/2023   Component Date Value Ref Range Status    IgG 11/03/2023 1,108  603 - 1,613 mg/dL Final    IgA 11/03/2023 218  61 - 437 mg/dL Final    IgM 11/03/2023 68  20 - 172 mg/dL Final    Albumin 11/03/2023 3.6  2.9 - 4.4 g/dL Final    Alpha-1-Globulin 11/03/2023 0.2  0.0 - 0.4 g/dL Final    Alpha-2-Globulin 11/03/2023 0.7  0.4 - 1.0 g/dL Final    Beta Globulin 11/03/2023 1.2  0.7 - 1.3 g/dL Final    Gamma Globulin 11/03/2023 1.0  0.4 - 1.8 g/dL Final    M-Trevor 11/03/2023 Not Observed  Not Observed g/dL Final    Globulin 11/03/2023 3.1  2.2 - 3.9 g/dL Final    A/G Ratio 11/03/2023 1.2  0.7 - 1.7 Final    Immunofixation Reflex, Serum 11/03/2023 Comment   Final    No monoclonality detected.    Please note 11/03/2023 Comment   Final    Comment: Protein electrophoresis scan will " follow via computer, mail, or   delivery.      PTH, Intact 11/03/2023 25  15 - 65 pg/mL Final    Ionized Calcium 11/03/2023 5.1  4.5 - 5.6 mg/dL Final    Phosphorus 11/03/2023 2.0 (L)  2.8 - 4.1 mg/dL Final    Glucose 11/03/2023 201 (H)  70 - 99 mg/dL Final    BUN 11/03/2023 26  8 - 27 mg/dL Final    Creatinine 11/03/2023 1.68 (H)  0.76 - 1.27 mg/dL Final    EGFR Result 11/03/2023 44 (L)  >59 mL/min/1.73 Final    BUN/Creatinine Ratio 11/03/2023 15  10 - 24 Final    Sodium 11/03/2023 137  134 - 144 mmol/L Final    Potassium 11/03/2023 4.8  3.5 - 5.2 mmol/L Final    Chloride 11/03/2023 101  96 - 106 mmol/L Final    Total CO2 11/03/2023 25  20 - 29 mmol/L Final    Calcium 11/03/2023 9.8  8.6 - 10.2 mg/dL Final    Total Protein 11/03/2023 6.7  6.0 - 8.5 g/dL Final    Albumin 11/03/2023 4.2  3.9 - 4.9 g/dL Final    Globulin 11/03/2023 2.5  1.5 - 4.5 g/dL Final    A/G Ratio 11/03/2023 1.7  1.2 - 2.2 Final    Total Bilirubin 11/03/2023 0.5  0.0 - 1.2 mg/dL Final    Alkaline Phosphatase 11/03/2023 116  44 - 121 IU/L Final    AST (SGOT) 11/03/2023 21  0 - 40 IU/L Final    ALT (SGPT) 11/03/2023 40  0 - 44 IU/L Final    Interpretation 11/03/2023 Note   Final    Comment: -------------------------------  CHRONIC KIDNEY DISEASE:  EGFR, BLOOD PRESSURE, AND PROTEINURIA ASSESSMENT  The overall regression of eGFR with time is not  statistically significant. Current eGFR is 44 mL/min/1.73mE2  corresponding to CKD stage 3b. Potassium is within goal and  has not changed significantly, was 4.4 and now is 4.8  mmol/L. Glycemic control (HB A1c: 7.7 % 10/6/2023) is above  goal but may be appropriate if patient is at risk for  hypoglycemia.  EGFR, BLOOD PRESSURE, AND PROTEINURIA TREATMENT SUGGESTIONS  -  Guidelines recommend treating patients with type 2 diabetes  and CKD with an SGLT2 inhibitor for cardiorenal protection.  Guidelines recommend a target blood pressure of 120/80 mmHg  or less to reduce cardiovascular risk and CKD  progression.  Assessment of albuminuria (urine albumin:creatinine ratio or  urine protein:creatinine ratio preferred) is recommended at  least annually in CKD patients for staging and disease  prognosis.  EGFR, BLOOD PRESSURE, AND PROTEINURI                           A FOLLOW-UP  -  Spot Urine Panel (Albumin preferred) is recommended by  guidelines, at least yearly; fasting Renal Panel within 1  month;  BONE and MINERAL ASSESSMENT  Intact PTH is within goal, 25 pg/mL. Phosphorus is below  goal, 2.0 mg/dL. Calcium is within goal and has decreased,  was 10.7 and now is 9.8 mg/dL. Carbon Dioxide is within goal  and has not changed significantly, was 24 and now is 25  mmol/L. Guidelines recommend the measurement of 25-hydroxy  vitamin D in patients with CKD.  BONE and MINERAL TREATMENT SUGGESTIONS  -  Discontinue low phosphate diet if in use. Consider  nutritional phosphate deficiency. Reduce or discontinue  phosphate binder if in use.  BONE and MINERAL FOLLOW-UP  -  fasting Renal Panel within 1 month; fasting PTH with Renal  Panel within 6 months; 25-Hydroxy Vitamin D is recommended  by guidelines, at least yearly;  LIPIDS ASSESSMENT  Most recent order does not include a fasting Lipid Panel.  LIPIDS FOLLOW-UP  -  fasting Lipid Panel within 11 months;                             ANEMIA ASSESSMENT  Most recent order does not include a CBC Panel or iron  studies.  ANEMIA FOLLOW-UP  -  CBC is recommended by guidelines, at least yearly;  -------------------------------  DISCLAIMER  These assessments and treatment suggestions are provided as  a convenience in support of the physician-patient  relationship and are not intended to replace the physician's  clinical judgment. They are derived from national guidelines  in addition to other evidence and expert opinion. The  clinician should consider this information within the  context of clinical opinion and the individual patient.  SEE GUIDANCE FOR CHRONIC KIDNEY DISEASE  PROGRAM: Kidney  Disease Improving Global Outcomes (KDIGO) clinical practice  guidelines are at http://kdigo.org/home/guidelines/.  National [employer] Kidney Disease Outcomes Quality  Initiative (KDOQI (TM)), with its limitations and  disclaimers, are at www.kidney.org/professionals/KDOQI. This  program is intended for patients who have been                            diagnosed  with stages 3, 4, or pre-dialysis 5 CKD. It is not intended  for children, pregnant patients, or transplant patients.       Assessment & Plan   Diagnoses and all orders for this visit:    1. Type 2 diabetes mellitus with complication, without long-term current use of insulin (Primary)  -     Comprehensive Metabolic Panel  -     Hemoglobin A1c  -     Lipid Panel  -     TSH    2. Essential hypertension    3. Hyperlipidemia, unspecified hyperlipidemia type  -     Comprehensive Metabolic Panel  -     Lipid Panel      Continue glimepiride 2 mg/day and Januvia 50 mg a day.    May decrease Lasix 20 mg 1/2 tablet/day.  Continue Coreg, eplerenone and fosinopril.  Will defer blood pressure management to Dr. Russ.    Continue Lipitor 10 mg a day.    Follow-up with Dr. Humberto Kimble.    Copy my note sent to Dr. Russ and Dr. Humberto Kimble.    RTC 6 mos.

## 2024-04-21 RX ORDER — GLIMEPIRIDE 1 MG/1
3 TABLET ORAL EVERY MORNING
Qty: 270 TABLET | Refills: 2 | Status: SHIPPED | OUTPATIENT
Start: 2024-04-21

## 2024-04-21 NOTE — PROGRESS NOTES
Hemoglobin A1c higher at 7.8%.  Increase glimepiride to 3 mg every morning.  May take glimepiride 2 mg 1 and half tablet every morning until current supply runs out.  Prescription for glimepiride 1 mg 3 tablets every morning was sent to pharmacy.  LDL 49.  HDL 49.  Continue Lipitor 10 mg/day.  Normal thyroid function tests.  Serum creatinine elevated at 1.66 mg per DL with an EGFR of 44.6 and stable.  Follow-up with Dr. Russ.  Please notify patient of results and instructions.

## 2024-06-17 RX ORDER — ATORVASTATIN CALCIUM 10 MG/1
TABLET, FILM COATED ORAL
Qty: 90 TABLET | Refills: 2 | Status: SHIPPED | OUTPATIENT
Start: 2024-06-17

## 2024-06-17 NOTE — TELEPHONE ENCOUNTER
Rx Refill Note  Requested Prescriptions     Pending Prescriptions Disp Refills    atorvastatin (LIPITOR) 10 MG tablet [Pharmacy Med Name: ATORVASTATIN TAB 10MG] 90 tablet 3     Sig: TAKE 1 TABLET DAILY      Last office visit with prescribing clinician: 4/16/2024   Last telemedicine visit with prescribing clinician: Visit date not found   Next office visit with prescribing clinician: 10/29/2024                         Would you like a call back once the refill request has been completed: [] Yes [] No    If the office needs to give you a call back, can they leave a voicemail: [] Yes [] No    Radha Sales  06/17/24, 08:35 EDT

## 2024-08-27 DIAGNOSIS — E78.5 HYPERLIPIDEMIA, UNSPECIFIED HYPERLIPIDEMIA TYPE: ICD-10-CM

## 2024-08-27 RX ORDER — FUROSEMIDE 20 MG
TABLET ORAL
Qty: 45 TABLET | Refills: 2 | Status: SHIPPED | OUTPATIENT
Start: 2024-08-27

## 2024-08-27 NOTE — TELEPHONE ENCOUNTER
Rx Refill Note  Requested Prescriptions     Pending Prescriptions Disp Refills    furosemide (LASIX) 20 MG tablet [Pharmacy Med Name: FUROSEMIDE TAB 20MG] 90 tablet 2     Sig: TAKE 1 TABLET DAILY FOR    HIGH BLOOD PRESSURE        DISORDER. DISCONTINUE      CHLORTHALIDONE      Last office visit with prescribing clinician: 4/16/2024   Last telemedicine visit with prescribing clinician: Visit date not found   Next office visit with prescribing clinician: 10/29/2024                         Would you like a call back once the refill request has been completed: [] Yes [] No    If the office needs to give you a call back, can they leave a voicemail: [] Yes [] No    Radha Sales  08/27/24, 10:55 EDT

## 2024-09-23 RX ORDER — SITAGLIPTIN 50 MG/1
TABLET, FILM COATED ORAL
Qty: 90 TABLET | Refills: 2 | Status: SHIPPED | OUTPATIENT
Start: 2024-09-23

## 2024-10-29 ENCOUNTER — OFFICE VISIT (OUTPATIENT)
Dept: ENDOCRINOLOGY | Age: 69
End: 2024-10-29
Payer: COMMERCIAL

## 2024-10-29 VITALS
HEIGHT: 72 IN | OXYGEN SATURATION: 97 % | WEIGHT: 206.8 LBS | SYSTOLIC BLOOD PRESSURE: 138 MMHG | HEART RATE: 64 BPM | DIASTOLIC BLOOD PRESSURE: 92 MMHG | BODY MASS INDEX: 28.01 KG/M2 | TEMPERATURE: 97.9 F

## 2024-10-29 DIAGNOSIS — E78.5 HYPERLIPIDEMIA, UNSPECIFIED HYPERLIPIDEMIA TYPE: ICD-10-CM

## 2024-10-29 DIAGNOSIS — I10 ESSENTIAL HYPERTENSION: ICD-10-CM

## 2024-10-29 DIAGNOSIS — E11.8 TYPE 2 DIABETES MELLITUS WITH COMPLICATION, WITHOUT LONG-TERM CURRENT USE OF INSULIN: Primary | ICD-10-CM

## 2024-10-29 DIAGNOSIS — N18.32 STAGE 3B CHRONIC KIDNEY DISEASE: ICD-10-CM

## 2024-10-29 NOTE — PROGRESS NOTES
Subjective   Jamal Lemus is a 69 y.o. male.     History of Present Illness     He has type 2 diabetes mellitus.  He is on glimepiride 2 mg/day and Januvia 50 mg/day.  He does not check his blood sugars at home.  He has lost 4 lbs since 4/24.  He is not on a diabetic diet.        His last eye examination was 2/24 and there is no retinopathy.  He has early cataracts.  He denies any blurred vision. He denies any paresthesias. He has no microalbuminuria on urine sample taken in October 2023      He has hypertension and renal insufficiency. He is on Coreg, furosemide, eplerenone, and fosinopril.  Chlorthalidone was discontinued because of hypercalcemia.  He complains of increased urinary flow since he was switch to Lasix 20 mg/day.  He follows with Dr. Russ. He denies any chest pain, shortness of breath, or pedal edema.       He has hyperlipidemia and has been on Lipitor 10 mg/day. He denies any myalgia.        He is retired. He is due for colonoscopy. He denies bowel symptoms.  He has no family history of colon or prostate cancer.  He does not want to have Cologuard.  He does not have a primary care physician.     He had elevated PSA in September 2022 at 9.11 ng/mL.  He has no family history of prostate cancer.  He had a negative prostate biopsy done by Dr. Kimble in 12/22.  He denies urinary symptoms.  He follows with Dr. Humberto Kimble.    The following portions of the patient's history were reviewed and updated as appropriate: allergies, current medications, past family history, past medical history, past social history, past surgical history, and problem list.    Review of Systems   Eyes:  Negative for visual disturbance.   Respiratory:  Negative for shortness of breath.    Cardiovascular: Negative.    Gastrointestinal: Negative.    Genitourinary: Negative.    Musculoskeletal:  Negative for myalgias.   Neurological:  Negative for numbness.     Vitals:    10/29/24 0915   BP: 138/92   Pulse: 64   Temp: 97.9  "°F (36.6 °C)   TempSrc: Oral   SpO2: 97%   Weight: 93.8 kg (206 lb 12.8 oz)   Height: 182.9 cm (72.01\")      Objective   Physical Exam  Constitutional:       General: He is not in acute distress.     Appearance: Normal appearance. He is obese. He is not ill-appearing, toxic-appearing or diaphoretic.   Eyes:      General: No scleral icterus.        Right eye: No discharge.         Left eye: No discharge.      Extraocular Movements: Extraocular movements intact.      Conjunctiva/sclera: Conjunctivae normal.   Cardiovascular:      Rate and Rhythm: Normal rate and regular rhythm.      Pulses: Normal pulses.      Heart sounds: Normal heart sounds.   Pulmonary:      Breath sounds: Normal breath sounds. No rales.   Chest:      Chest wall: No tenderness.   Abdominal:      General: Bowel sounds are normal.      Palpations: Abdomen is soft.      Tenderness: There is no right CVA tenderness or left CVA tenderness.   Musculoskeletal:      Right lower leg: No edema.      Left lower leg: No edema.   Skin:     General: Skin is warm and dry.   Neurological:      Mental Status: He is alert and oriented to person, place, and time.      Comments: Intact light touch in lower extremity   Psychiatric:         Behavior: Behavior normal.       Office Visit on 04/16/2024   Component Date Value Ref Range Status    Glucose 04/16/2024 139 (H)  65 - 99 mg/dL Final    BUN 04/16/2024 22  8 - 23 mg/dL Final    Creatinine 04/16/2024 1.66 (H)  0.76 - 1.27 mg/dL Final    EGFR Result 04/16/2024 44.6 (L)  >60.0 mL/min/1.73 Final    Comment: GFR Normal >60  Chronic Kidney Disease <60  Kidney Failure <15      BUN/Creatinine Ratio 04/16/2024 13.3  7.0 - 25.0 Final    Sodium 04/16/2024 138  136 - 145 mmol/L Final    Potassium 04/16/2024 4.4  3.5 - 5.2 mmol/L Final    Chloride 04/16/2024 102  98 - 107 mmol/L Final    Total CO2 04/16/2024 25.2  22.0 - 29.0 mmol/L Final    Calcium 04/16/2024 9.8  8.6 - 10.5 mg/dL Final    Total Protein 04/16/2024 7.1  6.0 - " 8.5 g/dL Final    Albumin 04/16/2024 4.4  3.5 - 5.2 g/dL Final    Globulin 04/16/2024 2.7  gm/dL Final    A/G Ratio 04/16/2024 1.6  g/dL Final    Total Bilirubin 04/16/2024 0.9  0.0 - 1.2 mg/dL Final    Alkaline Phosphatase 04/16/2024 98  39 - 117 U/L Final    AST (SGOT) 04/16/2024 28  1 - 40 U/L Final    ALT (SGPT) 04/16/2024 44 (H)  1 - 41 U/L Final    Hemoglobin A1C 04/16/2024 7.80 (H)  4.80 - 5.60 % Final    Comment: Hemoglobin A1C Ranges:  Increased Risk for Diabetes  5.7% to 6.4%  Diabetes                     >= 6.5%  Diabetic Goal                < 7.0%      Total Cholesterol 04/16/2024 114  0 - 200 mg/dL Final    Comment: Cholesterol Reference Ranges  (U.S. Department of Health and Human Services ATP III  Classifications)  Desirable          <200 mg/dL  Borderline High    200-239 mg/dL  High Risk          >240 mg/dL  Triglyceride Reference Ranges  (U.S. Department of Health and Human Services ATP III  Classifications)  Normal           <150 mg/dL  Borderline High  150-199 mg/dL  High             200-499 mg/dL  Very High        >500 mg/dL  HDL Reference Ranges  (U.S. Department of Health and Human Services ATP III  Classifications)  Low     <40 mg/dl (major risk factor for CHD)  High    >60 mg/dl ('negative' risk factor for CHD)  LDL Reference Ranges  (U.S. Department of Health and Human Services ATP III  Classifications)  Optimal          <100 mg/dL  Near Optimal     100-129 mg/dL  Borderline High  130-159 mg/dL  High             160-189 mg/dL  Very High        >189 mg/dL      Triglycerides 04/16/2024 78  0 - 150 mg/dL Final    HDL Cholesterol 04/16/2024 49  40 - 60 mg/dL Final    VLDL Cholesterol Sj 04/16/2024 16  5 - 40 mg/dL Final    LDL Chol Calc (NIH) 04/16/2024 49  0 - 100 mg/dL Final    TSH 04/16/2024 0.699  0.270 - 4.200 uIU/mL Final    Interpretation 04/16/2024 Note   Final    Supplemental report is available.     Assessment & Plan   Diagnoses and all orders for this visit:    1. Type 2 diabetes  mellitus with complication, without long-term current use of insulin (Primary)  -     Comprehensive Metabolic Panel  -     Microalbumin / Creatinine Urine Ratio - Urine, Clean Catch  -     Hemoglobin A1c  -     Lipid Panel  -     TSH Rfx On Abnormal To Free T4    2. Stage 3b chronic kidney disease  -     Microalbumin / Creatinine Urine Ratio - Urine, Clean Catch    3. Essential hypertension  -     Comprehensive Metabolic Panel    4. Hyperlipidemia, unspecified hyperlipidemia type  -     Lipid Panel  -     TSH Rfx On Abnormal To Free T4      Continue glimepiride 2 mg/day and Januvia 50 mg a day.    Continue Coreg, furosemide, eplerenone, and fosinopril per Dr. Russ.  Will defer blood pressure management to Dr. Russ.    Continue Lipitor 10 mg/day.    Follow-up with Dr. Humberto Kimble as scheduled.    Advised to get a colonoscopy.  Advised to get a flu vaccine.  Advised to get a PCP.    Copy my note sent to Dr. Russ and Dr. Humberto Kimble.    RTC 6 mos.

## 2024-11-01 LAB
ALBUMIN SERPL-MCNC: 4.5 G/DL (ref 3.5–5.2)
ALBUMIN/CREAT UR: 7 MG/G CREAT (ref 0–29)
ALBUMIN/GLOB SERPL: 1.7 G/DL
ALP SERPL-CCNC: 102 U/L (ref 39–117)
ALT SERPL-CCNC: 47 U/L (ref 1–41)
AST SERPL-CCNC: 24 U/L (ref 1–40)
BILIRUB SERPL-MCNC: 0.7 MG/DL (ref 0–1.2)
BUN SERPL-MCNC: 21 MG/DL (ref 8–23)
BUN/CREAT SERPL: 14.3 (ref 7–25)
CALCIUM SERPL-MCNC: 9.9 MG/DL (ref 8.6–10.5)
CHLORIDE SERPL-SCNC: 104 MMOL/L (ref 98–107)
CHOLEST SERPL-MCNC: 130 MG/DL (ref 0–200)
CO2 SERPL-SCNC: 24.6 MMOL/L (ref 22–29)
CREAT SERPL-MCNC: 1.47 MG/DL (ref 0.76–1.27)
CREAT UR-MCNC: 57.5 MG/DL
EGFRCR SERPLBLD CKD-EPI 2021: 51.3 ML/MIN/1.73
GLOBULIN SER CALC-MCNC: 2.6 GM/DL
GLUCOSE SERPL-MCNC: 166 MG/DL (ref 65–99)
HBA1C MFR BLD: 6.9 % (ref 4.8–5.6)
HDLC SERPL-MCNC: 55 MG/DL (ref 40–60)
IMP & REVIEW OF LAB RESULTS: NORMAL
LDLC SERPL CALC-MCNC: 61 MG/DL (ref 0–100)
MICROALBUMIN UR-MCNC: 4.3 UG/ML
POTASSIUM SERPL-SCNC: 4.3 MMOL/L (ref 3.5–5.2)
PROT SERPL-MCNC: 7.1 G/DL (ref 6–8.5)
SODIUM SERPL-SCNC: 139 MMOL/L (ref 136–145)
TRIGL SERPL-MCNC: 70 MG/DL (ref 0–150)
TSH SERPL DL<=0.005 MIU/L-ACNC: 1.01 UIU/ML (ref 0.27–4.2)
VLDLC SERPL CALC-MCNC: 14 MG/DL (ref 5–40)

## 2024-11-02 NOTE — PROGRESS NOTES
Hemoglobin A1c improved at 6.9%.  Diabetes is well-controlled.  Normal urine microalbumin.  LDL 61.  HDL 55.  Continue Lipitor 10 mg/day.  Normal thyroid function test.    Copy of labs sent to Dr. Russ and Dr. Humberto Kimble  [facility]

## 2025-02-10 RX ORDER — GLIMEPIRIDE 1 MG/1
3 TABLET ORAL EVERY MORNING
Qty: 270 TABLET | Refills: 2 | Status: SHIPPED | OUTPATIENT
Start: 2025-02-10

## 2025-02-10 NOTE — TELEPHONE ENCOUNTER
Rx Refill Note  Requested Prescriptions     Pending Prescriptions Disp Refills    glimepiride (AMARYL) 1 MG tablet [Pharmacy Med Name: GLIMEPIRIDE  TAB 1MG] 270 tablet 2     Sig: TAKE 3 TABLETS EVERY       MORNING      Last office visit with prescribing clinician: 10/29/2024   Last telemedicine visit with prescribing clinician: Visit date not found   Next office visit with prescribing clinician: 5/1/2025                         Would you like a call back once the refill request has been completed: [] Yes [] No    If the office needs to give you a call back, can they leave a voicemail: [] Yes [] No    Radha Sales  02/10/25, 12:44 EST

## 2025-03-24 RX ORDER — ATORVASTATIN CALCIUM 10 MG/1
10 TABLET, FILM COATED ORAL DAILY
Qty: 90 TABLET | Refills: 2 | Status: SHIPPED | OUTPATIENT
Start: 2025-03-24

## 2025-03-24 NOTE — TELEPHONE ENCOUNTER
Rx Refill Note  Requested Prescriptions     Pending Prescriptions Disp Refills    atorvastatin (LIPITOR) 10 MG tablet [Pharmacy Med Name: ATORVASTATIN TAB 10MG] 90 tablet 2     Sig: TAKE 1 TABLET DAILY      Last office visit with prescribing clinician: 10/29/2024   Last telemedicine visit with prescribing clinician: Visit date not found   Next office visit with prescribing clinician: 5/1/2025                         Would you like a call back once the refill request has been completed: [] Yes [] No    If the office needs to give you a call back, can they leave a voicemail: [] Yes [] No    Carine Sales MA  03/24/25, 09:43 EDT

## 2025-05-01 ENCOUNTER — OFFICE VISIT (OUTPATIENT)
Dept: ENDOCRINOLOGY | Age: 70
End: 2025-05-01
Payer: COMMERCIAL

## 2025-05-01 VITALS
BODY MASS INDEX: 28.25 KG/M2 | WEIGHT: 208.6 LBS | DIASTOLIC BLOOD PRESSURE: 90 MMHG | OXYGEN SATURATION: 98 % | HEART RATE: 65 BPM | HEIGHT: 72 IN | SYSTOLIC BLOOD PRESSURE: 124 MMHG

## 2025-05-01 DIAGNOSIS — E11.21 TYPE 2 DIABETES MELLITUS WITH NEPHROPATHY: ICD-10-CM

## 2025-05-01 DIAGNOSIS — E78.5 HYPERLIPIDEMIA, UNSPECIFIED HYPERLIPIDEMIA TYPE: ICD-10-CM

## 2025-05-01 DIAGNOSIS — I10 ESSENTIAL HYPERTENSION: ICD-10-CM

## 2025-05-01 DIAGNOSIS — E11.8 TYPE 2 DIABETES MELLITUS WITH COMPLICATION, WITHOUT LONG-TERM CURRENT USE OF INSULIN: Primary | ICD-10-CM

## 2025-05-01 LAB
ALBUMIN SERPL-MCNC: 4.4 G/DL (ref 3.5–5.2)
ALBUMIN/GLOB SERPL: 1.8 G/DL
ALP SERPL-CCNC: 103 U/L (ref 39–117)
ALT SERPL-CCNC: 25 U/L (ref 1–41)
AST SERPL-CCNC: 21 U/L (ref 1–40)
BILIRUB SERPL-MCNC: 0.6 MG/DL (ref 0–1.2)
BUN SERPL-MCNC: 25 MG/DL (ref 8–23)
BUN/CREAT SERPL: 18.9 (ref 7–25)
CALCIUM SERPL-MCNC: 9.3 MG/DL (ref 8.6–10.5)
CHLORIDE SERPL-SCNC: 106 MMOL/L (ref 98–107)
CHOLEST SERPL-MCNC: 114 MG/DL (ref 0–200)
CO2 SERPL-SCNC: 24 MMOL/L (ref 22–29)
CREAT SERPL-MCNC: 1.32 MG/DL (ref 0.76–1.27)
EGFRCR SERPLBLD CKD-EPI 2021: 58.4 ML/MIN/1.73
GLOBULIN SER CALC-MCNC: 2.5 GM/DL
GLUCOSE SERPL-MCNC: 111 MG/DL (ref 65–99)
HBA1C MFR BLD: 7.3 % (ref 4.8–5.6)
HDLC SERPL-MCNC: 46 MG/DL (ref 40–60)
IMP & REVIEW OF LAB RESULTS: NORMAL
LDLC SERPL CALC-MCNC: 54 MG/DL (ref 0–100)
POTASSIUM SERPL-SCNC: 4.6 MMOL/L (ref 3.5–5.2)
PROT SERPL-MCNC: 6.9 G/DL (ref 6–8.5)
SODIUM SERPL-SCNC: 140 MMOL/L (ref 136–145)
TRIGL SERPL-MCNC: 66 MG/DL (ref 0–150)
TSH SERPL DL<=0.005 MIU/L-ACNC: 1.01 UIU/ML (ref 0.27–4.2)
VLDLC SERPL CALC-MCNC: 14 MG/DL (ref 5–40)

## 2025-05-01 PROCEDURE — 99214 OFFICE O/P EST MOD 30 MIN: CPT | Performed by: INTERNAL MEDICINE

## 2025-05-01 NOTE — PROGRESS NOTES
Subjective   Jamal Lemus is a 69 y.o. male.     History of Present Illness     He has type 2 diabetes mellitus.  He came in for follow-up.      He is on glimepiride 3 mg/day and Januvia 50 mg/day.  He does not check his blood sugars at home.  He has gained 2 pounds since October 2024..  He is not on a diabetic diet.  His last meal was at 6:30 AM.      His last eye examination was 2/24 and there is no retinopathy.  He has early cataracts.  He denies any blurred vision. He denies any paresthesias. He has no microalbuminuria on urine sample taken in October 2024      He has hypertension and renal insufficiency. He is on Coreg, furosemide, eplerenone, and fosinopril.  Chlorthalidone was discontinued because of hypercalcemia.  He complains of increased urinary flow since he was switch to Lasix 20 mg/day.  He follows with Dr. Russ. He denies any chest pain, shortness of breath, or pedal edema.       He has hyperlipidemia and has been on Lipitor 10 mg/day. He denies any myalgia.       He is retired. He is due for colonoscopy. He denies bowel symptoms.  He has no family history of colon or prostate cancer.  He does not want to have Cologuard.  He does not have a primary care physician.     He had elevated PSA in September 2022 at 9.11 ng/mL.  He has no family history of prostate cancer.  He had a negative prostate biopsy done by Dr. Kimble in 12/22. He denies urinary symptoms.  He follows with Dr. Humberto Kimble.      The following portions of the patient's history were reviewed and updated as appropriate: allergies, current medications, past family history, past medical history, past social history, past surgical history, and problem list.    Review of Systems   Eyes:  Negative for visual disturbance.   Respiratory:  Negative for shortness of breath and wheezing.    Cardiovascular:  Negative for chest pain and palpitations.   Gastrointestinal: Negative.    Genitourinary: Negative.    Musculoskeletal:  Negative  "for myalgias.   Neurological:  Negative for numbness.     Vitals:    05/01/25 0843   BP: 124/90   Pulse: 65   SpO2: 98%   Weight: 94.6 kg (208 lb 9.6 oz)   Height: 182.9 cm (72.01\")      Objective   Physical Exam  Constitutional:       General: He is not in acute distress.     Appearance: Normal appearance. He is not ill-appearing, toxic-appearing or diaphoretic.   Eyes:      General: No scleral icterus.        Right eye: No discharge.         Left eye: No discharge.      Extraocular Movements: Extraocular movements intact.   Neck:      Vascular: No carotid bruit.   Cardiovascular:      Rate and Rhythm: Normal rate and regular rhythm.      Heart sounds: No murmur heard.     No gallop.   Pulmonary:      Breath sounds: Normal breath sounds. No rales.   Chest:      Chest wall: No tenderness.   Abdominal:      General: Bowel sounds are normal.      Palpations: Abdomen is soft.      Tenderness: There is no right CVA tenderness or left CVA tenderness.   Musculoskeletal:      Right lower leg: No edema.      Left lower leg: No edema.      Comments: Feet warm.  No cyanosis, pedal edema or clubbing.  No plantar ulcers.   Lymphadenopathy:      Cervical: No cervical adenopathy.   Neurological:      Mental Status: He is alert and oriented to person, place, and time.      Comments: Intact light touch in lower extremities.       Office Visit on 10/29/2024   Component Date Value Ref Range Status    Glucose 10/29/2024 166 (H)  65 - 99 mg/dL Final    BUN 10/29/2024 21  8 - 23 mg/dL Final    Creatinine 10/29/2024 1.47 (H)  0.76 - 1.27 mg/dL Final    EGFR Result 10/29/2024 51.3 (L)  >60.0 mL/min/1.73 Final    Comment: GFR Normal >60  Chronic Kidney Disease <60  Kidney Failure <15      BUN/Creatinine Ratio 10/29/2024 14.3  7.0 - 25.0 Final    Sodium 10/29/2024 139  136 - 145 mmol/L Final    Potassium 10/29/2024 4.3  3.5 - 5.2 mmol/L Final    Chloride 10/29/2024 104  98 - 107 mmol/L Final    Total CO2 10/29/2024 24.6  22.0 - 29.0 mmol/L " Final    Calcium 10/29/2024 9.9  8.6 - 10.5 mg/dL Final    Total Protein 10/29/2024 7.1  6.0 - 8.5 g/dL Final    Albumin 10/29/2024 4.5  3.5 - 5.2 g/dL Final    Globulin 10/29/2024 2.6  gm/dL Final    A/G Ratio 10/29/2024 1.7  g/dL Final    Total Bilirubin 10/29/2024 0.7  0.0 - 1.2 mg/dL Final    Alkaline Phosphatase 10/29/2024 102  39 - 117 U/L Final    AST (SGOT) 10/29/2024 24  1 - 40 U/L Final    ALT (SGPT) 10/29/2024 47 (H)  1 - 41 U/L Final    Creatinine, Urine 10/29/2024 57.5  Not Estab. mg/dL Final    Microalbumin, Urine 10/29/2024 4.3  Not Estab. ug/mL Final    Microalbumin/Creatinine Ratio 10/29/2024 7  0 - 29 mg/g creat Final    Comment:                        Normal:                0 -  29                         Moderately increased: 30 - 300                         Severely increased:       >300      Hemoglobin A1C 10/29/2024 6.90 (H)  4.80 - 5.60 % Final    Comment: Hemoglobin A1C Ranges:  Increased Risk for Diabetes  5.7% to 6.4%  Diabetes                     >= 6.5%  Diabetic Goal                < 7.0%      Total Cholesterol 10/29/2024 130  0 - 200 mg/dL Final    Comment: Cholesterol Reference Ranges  (U.[place] Department of Health and Human Services ATP III  Classifications)  Desirable          <200 mg/dL  Borderline High    200-239 mg/dL  High Risk          >240 mg/dL  Triglyceride Reference Ranges  (U.S. Department of Health and Human Services ATP III  Classifications)  Normal           <150 mg/dL  Borderline High  150-199 mg/dL  High             200-499 mg/dL  Very High        >500 mg/dL  HDL Reference Ranges  (U.S. Department of Health and Human Services ATP III  Classifications)  Low     <40 mg/dl (major risk factor for CHD)  High    >60 mg/dl ('negative' risk factor for CHD)  LDL Reference Ranges  (U.S. Department of Health and Human Services ATP III  Classifications)  Optimal          <100 mg/dL  Near Optimal     100-129 mg/dL  Borderline High  130-159 mg/dL  High             160-189 mg/dL  Very  High        >189 mg/dL      Triglycerides 10/29/2024 70  0 - 150 mg/dL Final    HDL Cholesterol 10/29/2024 55  40 - 60 mg/dL Final    VLDL Cholesterol Sj 10/29/2024 14  5 - 40 mg/dL Final    LDL Chol Calc ([facility]) 10/29/2024 61  0 - 100 mg/dL Final    TSH 10/29/2024 1.010  0.270 - 4.200 uIU/mL Final    Interpretation 10/29/2024 Note   Final    Supplemental report is available.     Assessment & Plan   Diagnoses and all orders for this visit:    1. Type 2 diabetes mellitus with complication, without long-term current use of insulin (Primary)  -     Comprehensive Metabolic Panel  -     Hemoglobin A1c  -     TSH Rfx On Abnormal To Free T4    2. Type 2 diabetes mellitus with nephropathy  -     Comprehensive Metabolic Panel  -     Hemoglobin A1c  -     TSH Rfx On Abnormal To Free T4    3. Hyperlipidemia, unspecified hyperlipidemia type  -     Lipid Panel  -     TSH Rfx On Abnormal To Free T4    4. Essential hypertension  -     Comprehensive Metabolic Panel      Continue glimepiride 3 mg/day and Januvia 50 mg/day.    Continue Lasix, Coreg, eplerenone, and fosinopril.  Follow-up with Dr. Cruz although.    Continue Lipitor 10 mg/day.    Advised to get a PCP.    Copy of my note sent to Dr. Russ.    RTC 6 mos.

## 2025-07-07 RX ORDER — SITAGLIPTIN 50 MG/1
TABLET, FILM COATED ORAL
Qty: 90 TABLET | Refills: 2 | Status: SHIPPED | OUTPATIENT
Start: 2025-07-07

## 2025-07-07 NOTE — TELEPHONE ENCOUNTER
Rx Refill Note  Requested Prescriptions     Pending Prescriptions Disp Refills    Januvia 50 MG tablet [Pharmacy Med Name: JANUVIA TAB 50MG] 90 tablet 2     Sig: TAKE 1 TABLET DAILY (THIS  REPLACES THE TRADJENTA)      Last office visit with prescribing clinician: 5/1/2025   Last telemedicine visit with prescribing clinician: Visit date not found   Next office visit with prescribing clinician: 11/4/2025                         Would you like a call back once the refill request has been completed: [] Yes [] No    If the office needs to give you a call back, can they leave a voicemail: [] Yes [] No    Radha Sales  07/07/25, 08:08 EDT  Radha Sales  7/7/2025  08:08 EDT

## 2025-08-18 DIAGNOSIS — E78.5 HYPERLIPIDEMIA, UNSPECIFIED HYPERLIPIDEMIA TYPE: ICD-10-CM

## 2025-08-18 RX ORDER — FUROSEMIDE 20 MG/1
TABLET ORAL
Qty: 45 TABLET | Refills: 0 | Status: SHIPPED | OUTPATIENT
Start: 2025-08-18